# Patient Record
Sex: FEMALE | Race: WHITE | Employment: UNEMPLOYED | URBAN - METROPOLITAN AREA
[De-identification: names, ages, dates, MRNs, and addresses within clinical notes are randomized per-mention and may not be internally consistent; named-entity substitution may affect disease eponyms.]

---

## 2017-03-10 DIAGNOSIS — E03.9 HYPOTHYROIDISM: ICD-10-CM

## 2017-03-15 RX ORDER — LEVOTHYROXINE SODIUM 88 UG/1
TABLET ORAL
Qty: 90 TABLET | Refills: 1 | Status: SHIPPED | OUTPATIENT
Start: 2017-03-15 | End: 2018-03-19 | Stop reason: SDUPTHER

## 2017-04-28 DIAGNOSIS — E55.9 VITAMIN D DEFICIENCY: ICD-10-CM

## 2017-05-03 RX ORDER — ERGOCALCIFEROL 1.25 MG/1
50000 CAPSULE ORAL WEEKLY
Qty: 13 CAPSULE | Refills: 0 | Status: SHIPPED | OUTPATIENT
Start: 2017-05-03

## 2018-04-30 ENCOUNTER — OFFICE VISIT (OUTPATIENT)
Dept: FAMILY MEDICINE CLINIC | Age: 63
End: 2018-04-30

## 2018-04-30 VITALS
RESPIRATION RATE: 16 BRPM | OXYGEN SATURATION: 96 % | HEART RATE: 74 BPM | SYSTOLIC BLOOD PRESSURE: 124 MMHG | TEMPERATURE: 95.9 F | BODY MASS INDEX: 35.08 KG/M2 | DIASTOLIC BLOOD PRESSURE: 86 MMHG | WEIGHT: 185.8 LBS

## 2018-04-30 DIAGNOSIS — E03.9 HYPOTHYROIDISM, UNSPECIFIED TYPE: ICD-10-CM

## 2018-04-30 PROCEDURE — 1036F TOBACCO NON-USER: CPT | Performed by: FAMILY MEDICINE

## 2018-04-30 PROCEDURE — G8427 DOCREV CUR MEDS BY ELIG CLIN: HCPCS | Performed by: FAMILY MEDICINE

## 2018-04-30 PROCEDURE — 99213 OFFICE O/P EST LOW 20 MIN: CPT | Performed by: FAMILY MEDICINE

## 2018-04-30 PROCEDURE — G8417 CALC BMI ABV UP PARAM F/U: HCPCS | Performed by: FAMILY MEDICINE

## 2018-04-30 PROCEDURE — 3017F COLORECTAL CA SCREEN DOC REV: CPT | Performed by: FAMILY MEDICINE

## 2018-04-30 RX ORDER — LEVOTHYROXINE SODIUM 88 UG/1
TABLET ORAL
Qty: 90 TABLET | Refills: 3 | Status: SHIPPED | OUTPATIENT
Start: 2018-04-30 | End: 2019-05-03 | Stop reason: SDUPTHER

## 2018-05-03 DIAGNOSIS — R74.8 ELEVATED SERUM GGT LEVEL: Primary | ICD-10-CM

## 2018-05-25 ENCOUNTER — HOSPITAL ENCOUNTER (OUTPATIENT)
Dept: ULTRASOUND IMAGING | Age: 63
Discharge: HOME | End: 2018-05-25
Attending: FAMILY MEDICINE | Admitting: FAMILY MEDICINE

## 2018-05-25 DIAGNOSIS — R74.8 ELEVATED SERUM GGT LEVEL: ICD-10-CM

## 2018-05-25 DIAGNOSIS — R74.8 ABNORMAL LEVELS OF OTHER SERUM ENZYMES: ICD-10-CM

## 2018-06-18 ENCOUNTER — OFFICE VISIT (OUTPATIENT)
Dept: FAMILY MEDICINE CLINIC | Age: 63
End: 2018-06-18

## 2018-06-18 VITALS
WEIGHT: 183.6 LBS | TEMPERATURE: 97.8 F | OXYGEN SATURATION: 97 % | RESPIRATION RATE: 14 BRPM | DIASTOLIC BLOOD PRESSURE: 69 MMHG | SYSTOLIC BLOOD PRESSURE: 127 MMHG | BODY MASS INDEX: 34.66 KG/M2 | HEART RATE: 56 BPM

## 2018-06-18 DIAGNOSIS — E66.9 OBESITY, CLASS I, BMI 30.0-34.9 (SEE ACTUAL BMI): ICD-10-CM

## 2018-06-18 DIAGNOSIS — K75.81 NASH (NONALCOHOLIC STEATOHEPATITIS): Primary | ICD-10-CM

## 2018-06-18 PROCEDURE — 99213 OFFICE O/P EST LOW 20 MIN: CPT | Performed by: FAMILY MEDICINE

## 2019-05-03 DIAGNOSIS — E03.9 HYPOTHYROIDISM, UNSPECIFIED TYPE: ICD-10-CM

## 2019-05-03 DIAGNOSIS — E66.9 OBESITY, CLASS I, BMI 30.0-34.9 (SEE ACTUAL BMI): ICD-10-CM

## 2019-05-03 DIAGNOSIS — R74.8 ELEVATED SERUM GGT LEVEL: ICD-10-CM

## 2019-05-03 DIAGNOSIS — E03.9 ACQUIRED HYPOTHYROIDISM: ICD-10-CM

## 2019-05-03 DIAGNOSIS — Z13.1 SCREENING FOR DIABETES MELLITUS: ICD-10-CM

## 2019-05-03 DIAGNOSIS — E55.9 VITAMIN D DEFICIENCY: ICD-10-CM

## 2019-05-03 DIAGNOSIS — K75.81 NASH (NONALCOHOLIC STEATOHEPATITIS): Primary | ICD-10-CM

## 2019-05-03 RX ORDER — LEVOTHYROXINE SODIUM 88 UG/1
TABLET ORAL
Qty: 90 TABLET | Refills: 0 | Status: SHIPPED | OUTPATIENT
Start: 2019-05-03 | End: 2020-01-07

## 2019-05-08 NOTE — TELEPHONE ENCOUNTER
Spoke to patient and she does not have insurance. She will go on medicare in July and will have labs done then and make appointment at that time.      FYI

## 2019-11-18 ENCOUNTER — OFFICE VISIT (OUTPATIENT)
Dept: ORTHOPEDIC SURGERY | Age: 64
End: 2019-11-18
Payer: MEDICARE

## 2019-11-18 VITALS
HEIGHT: 60 IN | WEIGHT: 180 LBS | BODY MASS INDEX: 35.34 KG/M2 | SYSTOLIC BLOOD PRESSURE: 123 MMHG | DIASTOLIC BLOOD PRESSURE: 73 MMHG

## 2019-11-18 DIAGNOSIS — M17.11 PRIMARY OSTEOARTHRITIS OF RIGHT KNEE: Primary | ICD-10-CM

## 2019-11-18 PROCEDURE — 20610 DRAIN/INJ JOINT/BURSA W/O US: CPT | Performed by: ORTHOPAEDIC SURGERY

## 2019-11-18 PROCEDURE — 3017F COLORECTAL CA SCREEN DOC REV: CPT | Performed by: ORTHOPAEDIC SURGERY

## 2019-11-18 PROCEDURE — 1036F TOBACCO NON-USER: CPT | Performed by: ORTHOPAEDIC SURGERY

## 2019-11-18 PROCEDURE — 1123F ACP DISCUSS/DSCN MKR DOCD: CPT | Performed by: ORTHOPAEDIC SURGERY

## 2019-11-18 PROCEDURE — G8484 FLU IMMUNIZE NO ADMIN: HCPCS | Performed by: ORTHOPAEDIC SURGERY

## 2019-11-18 PROCEDURE — 1090F PRES/ABSN URINE INCON ASSESS: CPT | Performed by: ORTHOPAEDIC SURGERY

## 2019-11-18 PROCEDURE — 99204 OFFICE O/P NEW MOD 45 MIN: CPT | Performed by: ORTHOPAEDIC SURGERY

## 2019-11-18 PROCEDURE — G8400 PT W/DXA NO RESULTS DOC: HCPCS | Performed by: ORTHOPAEDIC SURGERY

## 2019-11-18 PROCEDURE — 4040F PNEUMOC VAC/ADMIN/RCVD: CPT | Performed by: ORTHOPAEDIC SURGERY

## 2019-11-18 PROCEDURE — G8417 CALC BMI ABV UP PARAM F/U: HCPCS | Performed by: ORTHOPAEDIC SURGERY

## 2019-11-18 PROCEDURE — G8427 DOCREV CUR MEDS BY ELIG CLIN: HCPCS | Performed by: ORTHOPAEDIC SURGERY

## 2019-11-18 RX ORDER — BETAMETHASONE SODIUM PHOSPHATE AND BETAMETHASONE ACETATE 3; 3 MG/ML; MG/ML
12 INJECTION, SUSPENSION INTRA-ARTICULAR; INTRALESIONAL; INTRAMUSCULAR; SOFT TISSUE ONCE
Status: COMPLETED | OUTPATIENT
Start: 2019-11-18 | End: 2019-11-18

## 2019-11-18 RX ORDER — MELOXICAM 15 MG/1
15 TABLET ORAL DAILY
Qty: 90 TABLET | Refills: 3 | Status: ON HOLD | OUTPATIENT
Start: 2019-11-18 | End: 2020-11-03 | Stop reason: HOSPADM

## 2019-11-18 RX ADMIN — BETAMETHASONE SODIUM PHOSPHATE AND BETAMETHASONE ACETATE 12 MG: 3; 3 INJECTION, SUSPENSION INTRA-ARTICULAR; INTRALESIONAL; INTRAMUSCULAR; SOFT TISSUE at 12:36

## 2019-11-19 ENCOUNTER — TELEPHONE (OUTPATIENT)
Dept: ORTHOPEDIC SURGERY | Age: 64
End: 2019-11-19

## 2020-01-08 RX ORDER — LEVOTHYROXINE SODIUM 88 UG/1
TABLET ORAL
Qty: 30 TABLET | Refills: 0 | Status: SHIPPED | OUTPATIENT
Start: 2020-01-08 | End: 2020-01-27 | Stop reason: SDUPTHER

## 2020-01-27 ENCOUNTER — OFFICE VISIT (OUTPATIENT)
Dept: FAMILY MEDICINE CLINIC | Age: 65
End: 2020-01-27
Payer: MEDICARE

## 2020-01-27 VITALS
BODY MASS INDEX: 35.34 KG/M2 | OXYGEN SATURATION: 95 % | HEIGHT: 61 IN | WEIGHT: 187.2 LBS | RESPIRATION RATE: 16 BRPM | SYSTOLIC BLOOD PRESSURE: 132 MMHG | DIASTOLIC BLOOD PRESSURE: 80 MMHG | TEMPERATURE: 97.3 F | HEART RATE: 68 BPM

## 2020-01-27 DIAGNOSIS — K75.81 NASH (NONALCOHOLIC STEATOHEPATITIS): ICD-10-CM

## 2020-01-27 DIAGNOSIS — Z13.1 SCREENING FOR DIABETES MELLITUS: ICD-10-CM

## 2020-01-27 DIAGNOSIS — E55.9 VITAMIN D DEFICIENCY: ICD-10-CM

## 2020-01-27 DIAGNOSIS — E03.9 ACQUIRED HYPOTHYROIDISM: ICD-10-CM

## 2020-01-27 DIAGNOSIS — R74.8 ELEVATED SERUM GGT LEVEL: ICD-10-CM

## 2020-01-27 DIAGNOSIS — E66.9 OBESITY, CLASS I, BMI 30.0-34.9 (SEE ACTUAL BMI): ICD-10-CM

## 2020-01-27 LAB
A/G RATIO: 1.8 (ref 1.1–2.2)
ALBUMIN SERPL-MCNC: 4.6 G/DL (ref 3.4–5)
ALP BLD-CCNC: 110 U/L (ref 40–129)
ALT SERPL-CCNC: 27 U/L (ref 10–40)
ANION GAP SERPL CALCULATED.3IONS-SCNC: 15 MMOL/L (ref 3–16)
AST SERPL-CCNC: 23 U/L (ref 15–37)
BILIRUB SERPL-MCNC: 0.7 MG/DL (ref 0–1)
BUN BLDV-MCNC: 21 MG/DL (ref 7–20)
CALCIUM SERPL-MCNC: 10 MG/DL (ref 8.3–10.6)
CHLORIDE BLD-SCNC: 104 MMOL/L (ref 99–110)
CHOLESTEROL, TOTAL: 233 MG/DL (ref 0–199)
CO2: 23 MMOL/L (ref 21–32)
CREAT SERPL-MCNC: 0.5 MG/DL (ref 0.6–1.2)
GAMMA GLUTAMYL TRANSFERASE: 53 U/L (ref 5–36)
GFR AFRICAN AMERICAN: >60
GFR NON-AFRICAN AMERICAN: >60
GLOBULIN: 2.5 G/DL
GLUCOSE BLD-MCNC: 91 MG/DL (ref 70–99)
HDLC SERPL-MCNC: 72 MG/DL (ref 40–60)
LDL CHOLESTEROL CALCULATED: 141 MG/DL
POTASSIUM SERPL-SCNC: 4.5 MMOL/L (ref 3.5–5.1)
SODIUM BLD-SCNC: 142 MMOL/L (ref 136–145)
TOTAL PROTEIN: 7.1 G/DL (ref 6.4–8.2)
TRIGL SERPL-MCNC: 98 MG/DL (ref 0–150)
TSH REFLEX: 3.06 UIU/ML (ref 0.27–4.2)
VITAMIN D 25-HYDROXY: 21.7 NG/ML
VLDLC SERPL CALC-MCNC: 20 MG/DL

## 2020-01-27 PROCEDURE — 4040F PNEUMOC VAC/ADMIN/RCVD: CPT | Performed by: FAMILY MEDICINE

## 2020-01-27 PROCEDURE — G0438 PPPS, INITIAL VISIT: HCPCS | Performed by: FAMILY MEDICINE

## 2020-01-27 PROCEDURE — G8482 FLU IMMUNIZE ORDER/ADMIN: HCPCS | Performed by: FAMILY MEDICINE

## 2020-01-27 PROCEDURE — 1123F ACP DISCUSS/DSCN MKR DOCD: CPT | Performed by: FAMILY MEDICINE

## 2020-01-27 PROCEDURE — 3017F COLORECTAL CA SCREEN DOC REV: CPT | Performed by: FAMILY MEDICINE

## 2020-01-27 PROCEDURE — 90732 PPSV23 VACC 2 YRS+ SUBQ/IM: CPT | Performed by: FAMILY MEDICINE

## 2020-01-27 PROCEDURE — G0009 ADMIN PNEUMOCOCCAL VACCINE: HCPCS | Performed by: FAMILY MEDICINE

## 2020-01-27 RX ORDER — LEVOTHYROXINE SODIUM 88 UG/1
88 TABLET ORAL DAILY
Qty: 90 TABLET | Refills: 3 | Status: SHIPPED | OUTPATIENT
Start: 2020-01-27 | End: 2021-07-06

## 2020-01-27 ASSESSMENT — LIFESTYLE VARIABLES: HOW OFTEN DO YOU HAVE A DRINK CONTAINING ALCOHOL: 0

## 2020-01-27 ASSESSMENT — PATIENT HEALTH QUESTIONNAIRE - PHQ9
SUM OF ALL RESPONSES TO PHQ QUESTIONS 1-9: 1
SUM OF ALL RESPONSES TO PHQ QUESTIONS 1-9: 1

## 2020-01-27 NOTE — PATIENT INSTRUCTIONS
1) Get your labwork done at the [address] [place] [address]. --You can get your lab work, x-ray, MRI, or ultrasound at our nearest [facility] location across the parking lot at   [address], [address]  Lab hours (7AM - 5PM Monday through Friday; 8AM - noon on Saturdays),   Ph# [phone]  Radiology hours (7:00AM - 5PM Monday through Friday only)  --Call our office in 1 week if you have not heard about the results. Or check [facility] if you have previously enrolled. Personalized Preventive [facility] for [facility] - 1/27/2020  Medicare offers a range of preventive health benefits. Some of the tests and screenings are paid in full while other may be subject to a deductible, co-insurance, and/or copay. Some of these benefits include a comprehensive review of your medical history including lifestyle, illnesses that may run in your family, and various assessments and screenings as appropriate. After reviewing your medical record and screening and assessments performed today your provider may have ordered immunizations, labs, imaging, and/or referrals for you. A list of these orders (if applicable) as well as your Preventive Care list are included within your After Visit Summary for your review. Other Preventive Recommendations:    · A preventive eye exam performed by an eye specialist is recommended every 1-2 years to screen for glaucoma; cataracts, macular degeneration, and other eye disorders. · A preventive dental visit is recommended every 6 months. · Try to get at least 150 minutes of exercise per week or 10,000 steps per day on a pedometer . · Order or download the FREE \"Exercise & Physical Activity: Your Everyday Guide\" from [employer]. Call 4-608.364.4364 or search [employer] online. · You need 9222-9254 mg of calcium and 3112-7866 IU of vitamin D per day.  It is possible to meet your calcium requirement with diet alone, but a vitamin D supplement is usually necessary to meet this goal.  · When exposed to the sun, use a sunscreen that protects against both UVA and UVB radiation with an SPF of 30 or greater. Reapply every 2 to 3 hours or after sweating, drying off with a towel, or swimming. · Always wear a seat belt when traveling in a car. Always wear a helmet when riding a bicycle or motorcycle.

## 2020-01-27 NOTE — PROGRESS NOTES
evaluation of cognition reveals recent and remote memory intact. General Appearance: alert and oriented to person, place and time, well developed and well- nourished, in no acute distress  Skin: warm and dry, no rash or erythema  Head: normocephalic and atraumatic  Eyes: pupils equal, round, and reactive to light, extraocular eye movements intact, conjunctivae normal  ENT: tympanic membrane, external ear and ear canal normal bilaterally, nose without deformity, nasal mucosa and turbinates normal without polyps  Neck: supple and non-tender without mass, no thyromegaly or thyroid nodules, no cervical lymphadenopathy  Pulmonary/Chest: clear to auscultation bilaterally- no wheezes, rales or rhonchi, normal air movement, no respiratory distress  Cardiovascular: normal rate, regular rhythm, normal S1 and S2, no murmurs, rubs, clicks, or gallops, distal pulses intact, no carotid bruits  Abdomen: soft, non-tender, non-distended, normal bowel sounds, no masses or organomegaly  Extremities: no cyanosis, clubbing or edema  Musculoskeletal: normal range of motion, no joint swelling, deformity or tenderness  Neurologic: reflexes normal and symmetric, no cranial nerve deficit, gait, coordination and speech normal    Patient's complete Health Risk Assessment and screening values have been reviewed and are found in Flowsheets. The following problems were reviewed today and where indicated follow up appointments were made and/or referrals ordered. Positive Risk Factor Screenings with Interventions:     General Health:  General  In general, how would you say your health is?: Very Good  In the past 7 days, have you experienced any of the following?  New or Increased Pain, New or Increased Fatigue, Loneliness, Social Isolation, Stress or Anger?: None of These  Do you get the social and emotional support that you need?: Yes  Do you have a Living Will?: (!) No  General Health Risk Interventions:  · n/a    Health Habits/Nutrition:  Health Habits/Nutrition  Do you exercise for at least 20 minutes 2-3 times per week?: Yes  Have you lost any weight without trying in the past 3 months?: No  Do you eat fewer than 2 meals per day?: No  Have you seen a dentist within the past year?: (!) No  Body mass index is 35.37 kg/m².   Health Habits/Nutrition Interventions:  · Inadequate physical activity:  patient is not ready to increase his/her physical activity level at this time    Safety:  Safety  Do you have working smoke detectors?: (!) No  Have all throw rugs been removed or fastened?: (!) No  Do you have non-slip mats or surfaces in all bathtubs/showers?: (!) No  Do all of your stairways have a railing or banister?: Yes  Are your doorways, halls and stairs free of clutter?: Yes  Do you always fasten your seatbelt when you are in a car?: Yes  Safety Interventions:  · Home safety tips provided    Personalized Preventive Plan   Current Health Maintenance Status  Immunization History   Administered Date(s) Administered    Influenza A ([id number]) Vaccine PF IM 12/31/2009    Influenza Vaccine, unspecified formulation 09/07/2012, 09/15/2014, 09/25/2016    Influenza Virus Vaccine 09/07/2012, 09/15/2014, 08/31/2015    Influenza Whole 10/06/2009    Influenza, High Dose (Fluzone 65 yrs and older) 11/06/2019    Influenza, Noah Kapoor, IM, PF (6 mo and older Fluzone, Flulaval, Fluarix, and 3 yrs and older Afluria) 09/05/2017, 10/03/2018    Influenza, Triv, 3 Years and older, IM, PF (Afluria 5yrs and older) 09/25/2016    Pneumococcal Polysaccharide ([id number]) 01/27/2020    Td (Adult), 5 Lf Tetanus Toxoid, Pf (Tenivac, Decavac) 06/21/2005    Tdap (Boostrix, Adacel) 11/16/2010, 06/06/2015        Health Maintenance   Topic Date Due    Hepatitis C screen  1955    HIV screen  07/10/1969    Cervical cancer screen  07/10/1975    Shingles Vaccine (1 of 2) 07/10/2004    Breast cancer screen  02/02/2017    TSH testing  04/30/2019    DEXA (modify frequency per FRAX score)  07/10/2019    Annual Wellness Visit (AWV)  11/17/2019    Diabetes screen  04/30/2021    Colon cancer screen fecal DNA test (Cologuard)  05/23/2021    Lipid screen  04/30/2023    DTaP/Tdap/Td vaccine (3 - Td) 06/06/2025    Flu vaccine  Completed    Pneumococcal 65+ years Vaccine  Completed     Recommendations for Preventive Services Due: see orders and patient instructions/AVS.  . Recommended screening schedule for the next 5-10 years is provided to the patient in written form: see Patient Instructions/AVS.    Adilene Grider was seen today for medicare awv. Diagnoses and all orders for this visit:    Routine general medical examination at a health care facility    Hypothyroidism, unspecified type  -     levothyroxine (SYNTHROID) 88 MCG tablet;  Take 1 tablet by mouth Daily    Need for 23-polyvalent pneumococcal polysaccharide vaccine  -     PNEUMOVAX 23 subcutaneous/IM (Pneumococcal polysaccharide vaccine 23-valent >= 1yo)

## 2020-01-28 LAB
ESTIMATED AVERAGE GLUCOSE: 96.8 MG/DL
HBA1C MFR BLD: 5 %

## 2020-02-20 ENCOUNTER — OFFICE VISIT (OUTPATIENT)
Dept: ORTHOPEDIC SURGERY | Age: 65
End: 2020-02-20
Payer: MEDICARE

## 2020-02-20 VITALS — HEIGHT: 61 IN | BODY MASS INDEX: 35.3 KG/M2 | WEIGHT: 187 LBS

## 2020-02-20 PROCEDURE — 99214 OFFICE O/P EST MOD 30 MIN: CPT | Performed by: ORTHOPAEDIC SURGERY

## 2020-02-20 PROCEDURE — G8427 DOCREV CUR MEDS BY ELIG CLIN: HCPCS | Performed by: ORTHOPAEDIC SURGERY

## 2020-02-20 PROCEDURE — G8482 FLU IMMUNIZE ORDER/ADMIN: HCPCS | Performed by: ORTHOPAEDIC SURGERY

## 2020-02-20 PROCEDURE — 1090F PRES/ABSN URINE INCON ASSESS: CPT | Performed by: ORTHOPAEDIC SURGERY

## 2020-02-20 PROCEDURE — 4040F PNEUMOC VAC/ADMIN/RCVD: CPT | Performed by: ORTHOPAEDIC SURGERY

## 2020-02-20 PROCEDURE — 1036F TOBACCO NON-USER: CPT | Performed by: ORTHOPAEDIC SURGERY

## 2020-02-20 PROCEDURE — G8400 PT W/DXA NO RESULTS DOC: HCPCS | Performed by: ORTHOPAEDIC SURGERY

## 2020-02-20 PROCEDURE — G8417 CALC BMI ABV UP PARAM F/U: HCPCS | Performed by: ORTHOPAEDIC SURGERY

## 2020-02-20 PROCEDURE — 1123F ACP DISCUSS/DSCN MKR DOCD: CPT | Performed by: ORTHOPAEDIC SURGERY

## 2020-02-20 PROCEDURE — 3017F COLORECTAL CA SCREEN DOC REV: CPT | Performed by: ORTHOPAEDIC SURGERY

## 2020-05-11 ENCOUNTER — TELEPHONE (OUTPATIENT)
Dept: ORTHOPEDIC SURGERY | Age: 65
End: 2020-05-11

## 2020-05-11 ENCOUNTER — OFFICE VISIT (OUTPATIENT)
Dept: ORTHOPEDIC SURGERY | Age: 65
End: 2020-05-11
Payer: MEDICARE

## 2020-05-11 VITALS — HEIGHT: 61 IN | TEMPERATURE: 98 F | WEIGHT: 186.95 LBS | BODY MASS INDEX: 35.3 KG/M2

## 2020-05-11 PROCEDURE — G8417 CALC BMI ABV UP PARAM F/U: HCPCS | Performed by: ORTHOPAEDIC SURGERY

## 2020-05-11 PROCEDURE — 99214 OFFICE O/P EST MOD 30 MIN: CPT | Performed by: ORTHOPAEDIC SURGERY

## 2020-05-11 PROCEDURE — 1090F PRES/ABSN URINE INCON ASSESS: CPT | Performed by: ORTHOPAEDIC SURGERY

## 2020-05-11 PROCEDURE — G8400 PT W/DXA NO RESULTS DOC: HCPCS | Performed by: ORTHOPAEDIC SURGERY

## 2020-05-11 PROCEDURE — 1036F TOBACCO NON-USER: CPT | Performed by: ORTHOPAEDIC SURGERY

## 2020-05-11 PROCEDURE — 3017F COLORECTAL CA SCREEN DOC REV: CPT | Performed by: ORTHOPAEDIC SURGERY

## 2020-05-11 PROCEDURE — 4040F PNEUMOC VAC/ADMIN/RCVD: CPT | Performed by: ORTHOPAEDIC SURGERY

## 2020-05-11 PROCEDURE — G8428 CUR MEDS NOT DOCUMENT: HCPCS | Performed by: ORTHOPAEDIC SURGERY

## 2020-05-11 PROCEDURE — 1123F ACP DISCUSS/DSCN MKR DOCD: CPT | Performed by: ORTHOPAEDIC SURGERY

## 2020-05-11 NOTE — PROGRESS NOTES
5/11/20  History of Present Illness:  Neil Land is a 71 y.o. female    Chief complaint today in the office: Check evaluation right knee    Location right knee   Severity moderate to severe  Duration recently she could not get out of a chair and it was locked she had to have people pull her out of the chair  Associated sign/symptoms pain, catching, locking,    Medical History  Patient's medications, allergies, past medical, surgical, social and family histories were reviewed and updated as appropriate. Review of Systems  No new rashes  No numbness  No tingling  No fever  No depression  No new pain pattern  Pertinent items are noted in HPI  Review of systems reviewed from Patient History Form dated on 11/18/2019 and available in the patient's chart under the Media tab. No change in the patient's medical history form. Examination:  General Exam:    Vitals: not currently breastfeeding. Constitutional: Patient is adequately groomed with no evidence of malnutrition  Mental Status: The patient is alert and oriented to time, place and person. The patient's mood and affect are appropriate. Lymphatic: The lymphatic examination bilaterally reveals all areas to be without enlargement or induration. Vascular: Examination reveals no swelling or calf tenderness. Peripheral pulses are palpable and 2+. Neurological: The patient has good coordination. There is no weakness or sensory deficit. Skin:    Head/Neck: inspection reveals no rashes, ulcerations or lesions. Trunk:  inspection reveals no rashes, ulcerations or lesions. Right Lower Extremity: inspection reveals no rashes, ulcerations or lesions. Left Lower Extremity: inspection reveals no rashes, ulcerations or lesions.                                           PHYSICAL EXAM:        Knee Examination  Inspection:  No abrasions no lacerations no signs of infection or obvious deformity moderate obvious  swelling and joint effusion     Palpation: Palpation reveals moderate pain medial joint line,   Mild lateral joint line pain, moderate joint effusion    Range of Motion: 0-130 degrees flexion/ extension   Hip extension to 20 hip flexion to 70+  Lumbar ROM -20 extension flexion to 6 inches from the floor      Strength: Quadriceps testing 5/5, hamstring muscle testing 4/5, EHL against resistance is 5/5, hip flexor strength is intact 5/5, internal and external rotation of the hip against resistance is also intact 5/5    Special Tests: stable Lachman, negative anterior drawer, negative pivot shift, no posterior sag no posterior drawer does not open to valgus or varus stress at 0 or 30°, Steinmann's negative, Yulia's negative, Homans negative Rg negative, pedal pulses are +2/4 capillary refill is brisk sensation is intact ankle exam and hip exam are shows no pain with full range of motion provocative testing of the hip is nonpainful muscle testing around the hip is 5 over 5. Lumbar flexion to 6 inches from floor without pain      Inspection:      Gait: antalgic     Reflex:    Lower extremity Deep tendon reflexes +2/4 and equal bilaterally for patella and Achilles  Upper extremity reflexes:  of the biceps, triceps, brachioradialis +2/4 equal bilaterally    Contralateral  Knee: Negative Lachman negative anterior drawer negative pivot shift no posterior sag no posterior drawer does not open to valgus or varus stress at 0 or 30° negative Steinmann's negative Yulia's negative Homans negative Rg pedal pulses are +2/4 capillary refill is brisk sensation is intact ankle exam and hip exam are shows no pain with full range of motion provocative testing of the hip is nonpainful muscle testing around the hip is 5 over 5. Hip and lumbar testing does not reproduce pain evocative testing does not reproduce symptomatology.           Additional Examinations:  Right Upper Extremity:  Examination of the right upper extremity does not show any tenderness, deformity or injury. Range of motion is unremarkable. There is no gross instability. There are no rashes, ulcerations or lesions. Strength and tone are normal.  Left Upper Extremity: Examination of the left upper extremity does not show any tenderness, deformity or injury. Range of motion is unremarkable. There is no gross instability. There are no rashes, ulcerations or lesions. Strength and tone are normal.  Lower Back: Examination of the lower back does not show any tenderness, deformity or injury. Range of motion is unremarkable. There is no gross instability. There are no rashes, ulcerations or lesions. Strength and tone are normal.    Past Surgical History:   Procedure Laterality Date    EYE MUSCLE SURGERY      age 14     LITHOTRIPSY      2012, 2013    [address] [facility]   . Radiology:     X-rays reviewed in office:  I independently reviewed the films in the office today. Views AP bilateral knees today  Body Part right and left knee  Impression the standing shots show the right knee to be moderate to severe osteoarthritis especially medial joint space with severe joint space narrowing      Assessment: Severe osteoarthritis right knee    Impression: Same with acute exacerbation    Office Procedures:  No orders of the defined types were placed in this encounter. Previous Treatments: X-ray, injections, physical therapy, anti-inflammatories,    Possible other diagnoses:      Treatment Plan: At this point she wants to discuss having a total knee arthroplasty she thinks that the injections only helped for short period of time she is getting significant pain I will have her see Dr. Billy Cristina for an evaluation and treatment and then I will see her back here for another set of [address]. MARIBEL De León. [facility]  Sports Fellowship Trained  Board Certified  [employer] Team Physician        Disclaimer:     \"This note was dictated with

## 2020-05-21 ENCOUNTER — OFFICE VISIT (OUTPATIENT)
Dept: ORTHOPEDIC SURGERY | Age: 65
End: 2020-05-21
Payer: MEDICARE

## 2020-05-21 VITALS — BODY MASS INDEX: 36.52 KG/M2 | WEIGHT: 186 LBS | HEIGHT: 60 IN

## 2020-05-21 PROCEDURE — G8427 DOCREV CUR MEDS BY ELIG CLIN: HCPCS | Performed by: ORTHOPAEDIC SURGERY

## 2020-05-21 PROCEDURE — 4040F PNEUMOC VAC/ADMIN/RCVD: CPT | Performed by: ORTHOPAEDIC SURGERY

## 2020-05-21 PROCEDURE — 99214 OFFICE O/P EST MOD 30 MIN: CPT | Performed by: ORTHOPAEDIC SURGERY

## 2020-05-21 PROCEDURE — 3017F COLORECTAL CA SCREEN DOC REV: CPT | Performed by: ORTHOPAEDIC SURGERY

## 2020-05-21 PROCEDURE — G8417 CALC BMI ABV UP PARAM F/U: HCPCS | Performed by: ORTHOPAEDIC SURGERY

## 2020-05-21 PROCEDURE — 1123F ACP DISCUSS/DSCN MKR DOCD: CPT | Performed by: ORTHOPAEDIC SURGERY

## 2020-05-21 PROCEDURE — 20610 DRAIN/INJ JOINT/BURSA W/O US: CPT | Performed by: ORTHOPAEDIC SURGERY

## 2020-05-21 PROCEDURE — G8400 PT W/DXA NO RESULTS DOC: HCPCS | Performed by: ORTHOPAEDIC SURGERY

## 2020-05-21 PROCEDURE — 1036F TOBACCO NON-USER: CPT | Performed by: ORTHOPAEDIC SURGERY

## 2020-05-21 PROCEDURE — 1090F PRES/ABSN URINE INCON ASSESS: CPT | Performed by: ORTHOPAEDIC SURGERY

## 2020-05-21 RX ORDER — BETAMETHASONE SODIUM PHOSPHATE AND BETAMETHASONE ACETATE 3; 3 MG/ML; MG/ML
6 INJECTION, SUSPENSION INTRA-ARTICULAR; INTRALESIONAL; INTRAMUSCULAR; SOFT TISSUE ONCE
Status: COMPLETED | OUTPATIENT
Start: 2020-05-21 | End: 2020-05-21

## 2020-05-21 RX ADMIN — BETAMETHASONE SODIUM PHOSPHATE AND BETAMETHASONE ACETATE 6 MG: 3; 3 INJECTION, SUSPENSION INTRA-ARTICULAR; INTRALESIONAL; INTRAMUSCULAR; SOFT TISSUE at 14:03

## 2020-05-21 NOTE — PROGRESS NOTES
Proper lifting and carrying techniques,   Weight management discussed  Quitting smoking      6. Follow up:  She is going to follow-up with Dr. Ally Franklin was instructed to call the office if her symptoms worsen or if new symptoms appear prior to the next scheduled visit. She is specifically instructed to contact the office between now & her scheduled appointment if she has concerns related to her condition or if she needs assistance in scheduling the above tests. She is   welcome to call for an appointment sooner if she has any additional concerns or questions. Sabra Soliz DO    [facility]  Sports Fellowship Trained  Board Certified  [employer] Team Physician      Disclaimer: \"This note was dictated with voice recognition software. Though review and correction are routine, we apologize for any errors. \"

## 2020-05-29 ENCOUNTER — OFFICE VISIT (OUTPATIENT)
Dept: ORTHOPEDIC SURGERY | Age: 65
End: 2020-05-29
Payer: MEDICARE

## 2020-05-29 VITALS — HEIGHT: 60 IN | BODY MASS INDEX: 36.53 KG/M2 | WEIGHT: 186.07 LBS

## 2020-05-29 PROCEDURE — 99213 OFFICE O/P EST LOW 20 MIN: CPT | Performed by: ORTHOPAEDIC SURGERY

## 2020-05-29 PROCEDURE — G8427 DOCREV CUR MEDS BY ELIG CLIN: HCPCS | Performed by: ORTHOPAEDIC SURGERY

## 2020-05-29 PROCEDURE — G8417 CALC BMI ABV UP PARAM F/U: HCPCS | Performed by: ORTHOPAEDIC SURGERY

## 2020-05-29 PROCEDURE — 1090F PRES/ABSN URINE INCON ASSESS: CPT | Performed by: ORTHOPAEDIC SURGERY

## 2020-06-03 ENCOUNTER — HOSPITAL ENCOUNTER (OUTPATIENT)
Dept: PHYSICAL THERAPY | Age: 65
Setting detail: THERAPIES SERIES
Discharge: HOME | End: 2020-06-03
Payer: MEDICARE

## 2020-06-03 PROCEDURE — 97161 PT EVAL LOW COMPLEX 20 MIN: CPT | Performed by: PHYSICAL THERAPIST

## 2020-06-03 PROCEDURE — 97110 THERAPEUTIC EXERCISES: CPT | Performed by: PHYSICAL THERAPIST

## 2020-06-03 NOTE — PLAN OF CARE
The [address] [place] [address] Karla [address]  [phone]                                                       Physical Therapy Certification    Dear Referring Practitioner: Elle Harrington MD,    We had the pleasure of evaluating the following patient for physical therapy services at [address] [place] [address]. A summary of our findings can be found in the initial assessment below. This includes our plan of care. If you have any questions or concerns regarding these findings, please do not hesitate to contact me at the office phone number checked above. Thank you for the referral.       Physician Signature:_______________________________Date:__________________  By signing above (or electronic signature), therapists plan is approved by physician      Patient: Ariadna Lowery   : 1955   MRN: [id number]  Referring Physician: Referring Practitioner: Elle Harrington MD      Evaluation Date: 6/3/2020      Medical Diagnosis Information:  Diagnosis: M17.11 (ICD-10-CM) - Primary osteoarthritis of right knee   Treatment Diagnosis: M17.11 (ICD-10-CM) - Primary osteoarthritis of right knee; R53.1 Weakness                                         Insurance information: PT Insurance Information: [employer] - [place], auth req     Precautions/ Contra-indications: none    C-SSRS Triggered by Intake questionnaire (Past 2 wk assessment):   [x] No, Questionnaire did not trigger screening.   [] Yes, Patient intake triggered further evaluation      [] C-SSRS Screening completed  [] PCP notified via Plan of Care  [] Emergency services notified     Latex Allergy:  [x]NO      []YES  Preferred Language for Healthcare:   [x]English       []other:    SUBJECTIVE: Patient stated complaint: Pt has about 5 month hx of R knee pain. Baldev Degroot in 2020 had to go to ER because she couldn't move knee.  Saw Dr. Anamaria Mccloud and had cortisone injection, which helped reduce symptoms. Babatunde Castro again on 5/11/20. Saw Dr. Karolina Bhagat on 5/29/20 and had another injection but isn't feeling a significant of a benefit. C/O: pain, stiffness. Was sent to PT for strengthening prior to TKR d/t end-stage OA. Relevant Medical History: none  Functional Disability Index: LEFS - Inaccurate - Pt did not fill out correctly    Pain Scale: 2-10/10 sharp  Easing factors: moving around - avoiding stationary position  Provocative factors: prolonged positioning     Type: [x]Constant   []Intermittent  []Radiating [x]Localized []other:     Numbness/Tingling: none    Occupation/School: Retired. Living Status/Prior Level of Function: Independent with ADLs and IADLs. OBJECTIVE:      Flexibility L R Comment   Hamstring -30 -30 90/90   Gastroc To neutral  To neutral    ITB NT NT    Quad NT NT            ROM PROM AROM Overpressure Comment    L R L R L R    Flexion 128 118        Extension 0 -3                                Strength L R Comment   Quad 4 4    Hamstring 4- 4-    Gastroc NT NT    Hip  flexion 4 4    Hip abd 4- 4-                    Special Test Results/Comment   Meniscal Click NT   Crepitus Pos   Flexion Test NT   Valgus Laxity Neg   Varus Laxity Neg   Lachmans NT   Drop Back NT       Reflexes/Sensation:    [x]Dermatomes/Myotomes intact    []Reflexes equal and normal bilaterally   []Other:    Joint mobility:     []Normal    [x]Hypo   []Hyper    Palpation: joint lines    Functional Mobility/Transfers: mod ind    Posture: fair    Bandages/Dressings/Incisions: n/a    Gait: (include devices/WB status) short step length, wide base, antalgic, SPC    Orthopedic Special Tests: see above                       [x] Patient history, allergies, meds reviewed. Medical chart reviewed. See intake form. Review Of Systems (ROS):  [x]Performed Review of systems (Integumentary, CardioPulmonary, Neurological) by intake and observation. Intake form has been scanned into medical record.  Patient (from functional questionnaire and intake)   []Reduced ability to tolerate prolonged functional positions   [x]Reduced ability or difficulty with changes of positions or transfers between positions   []Reduced ability to maintain good posture and demonstrate good body mechanics with sitting, bending, and lifting   []Reduced ability to sleep   [] Reduced ability or tolerance with driving and/or computer work   [x]Reduced ability to perform lifting, carrying tasks   [x]Reduced ability to squat   []Reduced ability to forward bend   []Reduced ability to ambulate prolonged functional periods/distances/surfaces   [x]Reduced ability to ascend/descend stairs   [x]Reduced ability to run, hop or jump   []other:     Participation Restrictions   []Reduced participation in self care activities   [x]Reduced participation in home management activities   []Reduced participation in work activities   [x]Reduced participation in social activities. []Reduced participation in sport activities. Classification :    []Signs/symptoms consistent with post-surgical status including decreased ROM, strength and function.    []Signs/symptoms consistent with joint sprain/strain   []Signs/symptoms consistent with patella-femoral syndrome   [x]Signs/symptoms consistent with knee OA/hip OA   []Signs/symptoms consistent with internal derangement of knee/Hip   []Signs/symptoms consistent with functional hip weakness/NMR control      []Signs/symptoms consistent with tendinitis/tendinosis    []signs/symptoms consistent with pathology which may benefit from Dry needling      []other:      Prognosis/Rehab Potential:      []Excellent   [x]Good    [x]Fair   []Poor    Tolerance of evaluation/treatment:    []Excellent   [x]Good    []Fair   []Poor    Physical Therapy Evaluation Complexity Justification  [x] A history of present problem with:  [] no personal factors and/or comorbidities that impact the plan of care;  [x]1-2 personal factors and/or

## 2020-06-03 NOTE — FLOWSHEET NOTE
[facility], [employer]    Physical Therapy Daily Treatment Note  Date:  6/3/2020    Patient Name:  Reese Hines    :  1955  MRN: [id number]  Restrictions/Precautions:    Medical/Treatment Diagnosis Information:  · PREHAB FOR TKR  · Diagnosis: M17.11 (ICD-10-CM) - Primary osteoarthritis of right knee  · Treatment Diagnosis: M17.11 (ICD-10-CM) - Primary osteoarthritis of right knee; R53.1 Weakness  Insurance/Certification information:  PT Insurance Information: Yaa PAL req  Physician Information:  Referring Practitioner: Kolby Casillas MD  Has the plan of care been signed (Y/N):        []  Yes  [x]  No     Date of Patient follow up with Physician: 7/10/20      Is this a Progress Report:     []  Yes  [x]  No        If Yes:  Date Range for reporting period:  Beginnin/3/20  Ending:    Progress report will be due (10 Rx or 30 days whichever is less): 1/3/35       Recertification will be due (POC Duration  / 90 days whichever is less): 7/15/20        Visit # Insurance Allowable Auth Required   1 [place] [x]  Yes []  No      HEP Access Code [email or web address]  Updated 6/3/20     OUTCOME MEASURE DATE DEFICIT   LEFS NPV           Patient given satisfaction survey?  [] Yes   [] No        Latex Allergy:  [x]NO      []YES  Preferred Language for Healthcare:   [x]English       []other:    Pain level:  3-10/10     SUBJECTIVE:  See eval    OBJECTIVE:   Flexibility L R Comment   Hamstring -30 -30 90/90   Gastroc To neutral  To neutral     ITB NT NT     Quad NT NT                            ROM PROM AROM Overpressure Comment     L R L R L R     Flexion 128 118             Extension 0 -3                                                       Strength L R Comment   Quad 4 4     Hamstring 4- 4-     Gastroc NT NT     Hip  flexion 4 4     Hip abd 4- 4-                               Special Test Results/Comment   Meniscal Click NT   Crepitus Pos   Flexion Test NT   Valgus Laxity Neg Varus Laxity Neg   Lachmans NT   Drop Back NT         Reflexes/Sensation:               [x]? Dermatomes/Myotomes intact               []?Reflexes equal and normal bilaterally              []?Other:     Joint mobility:                []?Normal                       [x]? Hypo              []?Hyper     Palpation: joint lines     Functional Mobility/Transfers: mod ind     Posture: fair     Bandages/Dressings/Incisions: n/a     Gait: (include devices/WB status) short step length, wide base, antalgic, SPC       RESTRICTIONS/PRECAUTIONS: knee OA; hx of falls    Exercises/Interventions:   Exercise/Equipment Resistance/Repetitions Other comments   Stretching     Hamstring 3 x 30\"    Towel Pull 3 x 30\"    Inclined Calf     Hip Flexion     ITB     Groin     Quad                    SLR     Supine 3 x 5    Abduction     Adduction 10 x 10\" Ball squeeze   Prone     SLR+          Isometrics     Quad sets 10 x 10\"         Patellar Glides     Medial     Superior     Inferior          ROM     Sheet Pulls     Hang Weights     Passive     Active     Weight Shift     Ankle Pumps                    CKC     Calf raises 3 x 5    Wall sits     Step ups     1 leg stand     Squatting     CC TKE     Balance     bridges          PRE     Extension  RANGE:   Flexion  RANGE:        Quantum machines     Leg press      Leg extension     Leg curl          Manual interventions                     Therapeutic Exercise and NMR EXR  [x] (83778) Provided verbal/tactile cueing for activities related to strengthening, flexibility, endurance, ROM for improvements in LE, proximal hip, and core control with self care, mobility, lifting, ambulation.  [] (75861) Provided verbal/tactile cueing for activities related to improving balance, coordination, kinesthetic sense, posture, motor skill, proprioception  to assist with LE, proximal hip, and core control in self care, mobility, lifting, ambulation and eccentric single leg control.      NMR and Therapeutic (14587):     GOALS:  (cut and paste from eval)  Patient stated goal: Improve strength prior to TKR    []? Progressing: []? Met: []? Not Met: []? Adjusted     Therapist goals for Patient:   Short Term Goals: To be achieved in: 2 weeks  1. Independent in HEP and progression per patient tolerance, in order to prevent re-injury. []? Progressing: []? Met: []? Not Met: []? Adjusted   2. Patient will have a decrease in pain to facilitate improvement in movement, function, and ADLs as indicated by Functional Deficits. []? Progressing: []? Met: []? Not Met: []? Adjusted     Long Term Goals: To be achieved in: 6 weeks  1. Disability index score of 50% or less for the LEFS to assist with reaching prior level of function. []? Progressing: []? Met: []? Not Met: []? Adjusted  2. Patient will demonstrate increased AROM to 0-125 deg to allow for proper joint functioning as indicated by patients Functional Deficits. []? Progressing: []? Met: []? Not Met: []? Adjusted  3. Patient will demonstrate an increase in Strength to good proximal hip strength and control, within 5lb HHD in LE to allow for proper functional mobility as indicated by patients Functional Deficits. []? Progressing: []? Met: []? Not Met: []? Adjusted  4. Patient will return to household functional activities without increased symptoms or restriction. []? Progressing: []? Met: []? Not Met: []? Adjusted  5. Pt will be able to complete TUG within 12 seconds. (patient specific functional goal)    []? Progressing: []? Met: []? Not Met: []? Adjusted           Overall Progression Towards Functional goals/ Treatment Progress Update:  [] Patient is progressing as expected towards functional goals listed. [] Progression is slowed due to complexities/Impairments listed. [] Progression has been slowed due to co-morbidities.   [x] Plan just implemented, too soon to assess goals progression <30days   [] Goals require adjustment due to lack of progress  [] Patient is not progressing as expected and requires additional follow up with physician  [] Other    Prognosis for POC: [x] Good [] Fair  [] Poor      Patient requires continued skilled intervention: [x] Yes  [] No    Treatment/Activity Tolerance:  [x] Patient able to complete treatment  [] Patient limited by fatigue  [] Patient limited by pain     [] Patient limited by other medical complications  [] Other:       PLAN: See eval  [] Continue per plan of care [] Alter current plan (see comments above)  [x] Plan of care initiated [] Hold pending MD visit [] Discharge      Electronically signed by:  Meredith Kidney, PT, DPT    Note: If patient does not return for scheduled/ recommended follow up visits, this note will serve as a discharge from care along with most recent update on progress.

## 2020-06-10 ENCOUNTER — HOSPITAL ENCOUNTER (OUTPATIENT)
Dept: PHYSICAL THERAPY | Age: 65
Setting detail: THERAPIES SERIES
Discharge: HOME | End: 2020-06-10
Payer: MEDICARE

## 2020-06-10 PROCEDURE — 97110 THERAPEUTIC EXERCISES: CPT | Performed by: PHYSICAL THERAPIST

## 2020-06-10 NOTE — FLOWSHEET NOTE
(typically 20 minutes face-to-face)  [] EVAL (MOD) 82889 (typically 30 minutes face-to-face)  [] EVAL (HIGH) 95066 (typically 45 minutes face-to-face)  [] RE-EVAL   [x] RG(16660) x  2   [] IONTO  [] NMR (50177) x     [] VASO  [] Manual (61496) x      [] Other:  [] TA x      [] Mech Traction (07960)  [] ES(attended) (78304)      [] ES (un) (29583):     GOALS:  (cut and paste from eval)  Patient stated goal: Improve strength prior to TKR    []? Progressing: []? Met: []? Not Met: []? Adjusted     Therapist goals for Patient:   Short Term Goals: To be achieved in: 2 weeks  1. Independent in HEP and progression per patient tolerance, in order to prevent re-injury. []? Progressing: []? Met: []? Not Met: []? Adjusted   2. Patient will have a decrease in pain to facilitate improvement in movement, function, and ADLs as indicated by Functional Deficits. []? Progressing: []? Met: []? Not Met: []? Adjusted     Long Term Goals: To be achieved in: 6 weeks  1. Disability index score of 50% or less for the LEFS to assist with reaching prior level of function. []? Progressing: []? Met: []? Not Met: []? Adjusted  2. Patient will demonstrate increased AROM to 0-125 deg to allow for proper joint functioning as indicated by patients Functional Deficits. []? Progressing: []? Met: []? Not Met: []? Adjusted  3. Patient will demonstrate an increase in Strength to good proximal hip strength and control, within 5lb HHD in LE to allow for proper functional mobility as indicated by patients Functional Deficits. []? Progressing: []? Met: []? Not Met: []? Adjusted  4. Patient will return to household functional activities without increased symptoms or restriction. []? Progressing: []? Met: []? Not Met: []? Adjusted  5. Pt will be able to complete TUG within 12 seconds. (patient specific functional goal)    []? Progressing: []? Met: []? Not Met: []?  Adjusted           Overall Progression Towards Functional goals/ Treatment Progress Update:  [] Patient is progressing as expected towards functional goals listed. [] Progression is slowed due to complexities/Impairments listed. [] Progression has been slowed due to co-morbidities. [x] Plan just implemented, too soon to assess goals progression <30days   [] Goals require adjustment due to lack of progress  [] Patient is not progressing as expected and requires additional follow up with physician  [] Other    Prognosis for POC: [x] Good [] Fair  [] Poor      Patient requires continued skilled intervention: [x] Yes  [] No    Treatment/Activity Tolerance:  [x] Patient able to complete treatment  [] Patient limited by fatigue  [] Patient limited by pain     [] Patient limited by other medical complications  [x] Other: Pt had good tolerance to program today. Fatigued quickly with supine SLR. Infrapatella pain with knee ext and end-range, so switched to 90-30 deg arc of motion with good tolerance. Updated pt's HEP. Will continue to progress strength and endurance B/L in preparation for future surgery. PLAN: See eval  [] Continue per plan of care [] Alter current plan (see comments above)  [x] Plan of care initiated [] Hold pending MD visit [] Discharge      Electronically signed by:  Serge Hyman, PT, DPT    Note: If patient does not return for scheduled/ recommended follow up visits, this note will serve as a discharge from care along with most recent update on progress.

## 2020-06-24 ENCOUNTER — HOSPITAL ENCOUNTER (OUTPATIENT)
Dept: PHYSICAL THERAPY | Age: 65
Setting detail: THERAPIES SERIES
Discharge: HOME | End: 2020-06-24
Payer: MEDICARE

## 2020-06-24 PROCEDURE — 97110 THERAPEUTIC EXERCISES: CPT | Performed by: PHYSICAL THERAPIST

## 2020-06-24 PROCEDURE — 97530 THERAPEUTIC ACTIVITIES: CPT | Performed by: PHYSICAL THERAPIST

## 2020-06-24 NOTE — FLOWSHEET NOTE
Special Test Results/Comment   Meniscal Click NT   Crepitus Pos   Flexion Test NT   Valgus Laxity Neg   Varus Laxity Neg   Lachmans NT   Drop Back NT         Reflexes/Sensation:               [x]? Dermatomes/Myotomes intact               []?Reflexes equal and normal bilaterally              []?Other:     Joint mobility:                []?Normal                       [x]? Hypo              []?Hyper     Palpation: joint lines     Functional Mobility/Transfers: mod ind     Posture: fair     Bandages/Dressings/Incisions: n/a     Gait: (include devices/WB status) short step length, wide base, antalgic, SPC; reduced tolerance to distances longer than 50 ft       RESTRICTIONS/PRECAUTIONS: knee OA; hx of falls    Exercises/Interventions: ALL EXERCISES DONE B/L  Exercise/Equipment Resistance/Repetitions Other comments   Stretching     Hamstring 3 x 30\"    Towel Pull 3 x 30\" VCs for proper form   Inclined Calf     Hip Flexion     ITB     Groin     Quad          Bike          SLR/Glutes     Supine 3 x 5    Abduction     Adduction 10 x 10\" Ball squeeze   Prone     SLR+     Supine clams 3 x 10 Blue TB tied   Mini Bridge 3 x 5         Isometrics     Quad sets 10 x 10\"              Patellar Glides     Medial     Superior     Inferior          ROM     Sheet Pulls     Hang Weights     Passive     Active     Weight Shift     Ankle Pumps                    CKC     Calf raises 3 x 10    Wall sits     Step ups     1 leg stand     Squatting     CC TKE     Balance     bridges          PRE     Extension 3 x 10 RANGE: 90-30, 2#   Flexion 3 x 10 RANGE: avail. , seated, green tb        Quantum machines     Leg press      Leg extension     Leg curl          Manual interventions                     Therapeutic Exercise and NMR EXR  [x] (05877) Provided verbal/tactile cueing for activities related to strengthening, flexibility, endurance, ROM for improvements in LE, proximal hip, and core control with self care, mobility, lifting, ambulation.  [] (22488) Provided verbal/tactile cueing for activities related to improving balance, coordination, kinesthetic sense, posture, motor skill, proprioception  to assist with LE, proximal hip, and core control in self care, mobility, lifting, ambulation and eccentric single leg control. NMR and Therapeutic Activities:    [x] (02458 or 96209) Provided verbal/tactile cueing for activities related to improving balance, coordination, kinesthetic sense, posture, motor skill, proprioception and motor activation to allow for proper function of core, proximal hip and LE with self care and ADLs  [] (95621) Gait Re-education- Provided training and instruction to the patient for proper LE, core and proximal hip recruitment and positioning and eccentric body weight control with ambulation re-education including up and down stairs     Home Exercise Program:    [x] (16049) Reviewed/Progressed HEP activities related to strengthening, flexibility, endurance, ROM of core, proximal hip and LE for functional self-care, mobility, lifting and ambulation/stair navigation   [] (55301)Reviewed/Progressed HEP activities related to improving balance, coordination, kinesthetic sense, posture, motor skill, proprioception of core, proximal hip and LE for self care, mobility, lifting, and ambulation/stair navigation      Manual Treatments:  PROM / STM / Oscillations-Mobs:  G-I, II, III, IV (PA's, Inf., Post.)  [] (41320) Provided manual therapy to mobilize LE, proximal hip and/or LS spine soft tissue/joints for the purpose of modulating pain, promoting relaxation,  increasing ROM, reducing/eliminating soft tissue swelling/inflammation/restriction, improving soft tissue extensibility and allowing for proper ROM for normal function with self care, mobility, lifting and ambulation.      Modalities:  Pt declined    Charges:  Timed Code Treatment Minutes: 45'   Total Treatment Minutes: 38'   12:05 - 12:47    [] RENE (LOW) 95386

## 2020-07-08 ENCOUNTER — HOSPITAL ENCOUNTER (OUTPATIENT)
Dept: PHYSICAL THERAPY | Age: 65
Setting detail: THERAPIES SERIES
Discharge: HOME | End: 2020-07-08
Payer: MEDICARE

## 2020-07-08 PROCEDURE — 97110 THERAPEUTIC EXERCISES: CPT | Performed by: SPECIALIST/TECHNOLOGIST

## 2020-07-08 PROCEDURE — 97140 MANUAL THERAPY 1/> REGIONS: CPT | Performed by: SPECIALIST/TECHNOLOGIST

## 2020-07-08 NOTE — FLOWSHEET NOTE
[facility], [facility]    Physical Therapy Daily Treatment Note  Date:  2020    Patient Name:  Hannah Grace    :  1955  MRN: [id number]  Restrictions/Precautions:    Medical/Treatment Diagnosis Information:  · PREHAB FOR TKR  · Diagnosis: M17.11 (ICD-10-CM) - Primary osteoarthritis of right knee  · Treatment Diagnosis: M17.11 (ICD-10-CM) - Primary osteoarthritis of right knee; R53.1 Weakness  Insurance/Certification information:  PT Insurance Information: [place], Yousif Velasquez req  Physician Information:  Referring Practitioner: Vinicius Sarmiento MD  Has the plan of care been signed (Y/N):        []  Yes  [x]  No     Date of Patient follow up with Physician: 7/10/20      Is this a Progress Report:     []  Yes  [x]  No        If Yes:  Date Range for reporting period:  Beginnin/3/20  Ending:    Progress report will be due (10 Rx or 30 days whichever is less): 98       Recertification will be due (POC Duration  / 90 days whichever is less): 7/15/20        Visit # Insurance Allowable Auth Required   4 [place] [x]  Yes []  No      HEP Access Code [email or web address]  Updated 20     OUTCOME MEASURE DATE DEFICIT   LEFS NPV           Patient given satisfaction survey? [] Yes   [] No        Latex Allergy:  [x]NO      []YES  Preferred Language for Healthcare:   [x]English       []other:    Pain level:  10     SUBJECTIVE:  Pt reports she has been working hard on her HEP. Symptoms have remained the same since last visit.      OBJECTIVE:   Flexibility L R Comment   Hamstring -30 -30 90/90   Gastroc To neutral  To neutral     ITB NT NT     Quad NT NT                            ROM PROM AROM - 20 Overpressure Comment     L R L R L R     Flexion 128 118    122         Extension 0 -3    0                                                   Strength L R Comment   Quad 4 4     Hamstring 4- 4-     Gastroc NT NT     Hip  flexion 4 4     Hip abd 4- 4-                             Special Test Results/Comment   Meniscal Click NT   Crepitus Pos   Flexion Test NT   Valgus Laxity Neg   Varus Laxity Neg   Lachmans NT   Drop Back NT         Reflexes/Sensation:               [x]? Dermatomes/Myotomes intact               []?Reflexes equal and normal bilaterally              []?Other:     Joint mobility:                []?Normal                       [x]? Hypo              []?Hyper     Palpation: joint lines     Functional Mobility/Transfers: mod ind     Posture: fair     Bandages/Dressings/Incisions: n/a     Gait: (include devices/WB status) short step length, wide base, antalgic, SPC; reduced tolerance to distances longer than 50 ft       RESTRICTIONS/PRECAUTIONS: knee OA; hx of falls    Exercises/Interventions: ALL EXERCISES DONE B/L  Exercise/Equipment Resistance/Repetitions Other comments   Stretching     Hamstring - tabletop 3 x 30\"    Towel Pull 3 x 30\" VCs for proper form   Inclined Calf     Hip Flexion     ITB     Groin     Quad          Bike          SLR/Glutes     Supine 2 x 10    Abduction     Adduction 10 x 10\" Ball squeeze   Prone     SLR+     Supine clams 3 x 10 Or. Loop    Mini Bridge 2 x 10          Isometrics     Quad sets 20 x 5\"              Patellar Glides     Medial     Superior     Inferior          ROM     Sheet Pulls     Hang Weights     Passive     Active     Weight Shift     Ankle Pumps                    CKC     Calf raises 3 x 10    Wall sits     Step ups     1 leg stand     Squatting     CC TKE     Balance     bridges          PRE          Quantum machines     Leg press      Leg extension     Leg curl          Manual interventions 10' PROM, knee ext mobs          Gait training  7' SP cane with swing through          Therapeutic Exercise and NMR EXR  [x] (22576) Provided verbal/tactile cueing for activities related to strengthening, flexibility, endurance, ROM for improvements in LE, proximal hip, and core control with self care, mobility, lifting, ambulation.   [] face-to-face)  [] EVAL (MOD) 07249 (typically 30 minutes face-to-face)  [] EVAL (HIGH) 84801 (typically 45 minutes face-to-face)  [] RE-EVAL   [x] UP(35936) x  2   [] IONTO  [] NMR (27142) x     [] VASO  [x] Manual (35041) x  1    [] Other:  [] TA x 1     [] Mech Traction (90172)  [] ES(attended) (59912)      [] ES (un) (41183):     GOALS:  (cut and paste from eval)  Patient stated goal: Improve strength prior to TKR    []? Progressing: []? Met: []? Not Met: []? Adjusted     Therapist goals for Patient:   Short Term Goals: To be achieved in: 2 weeks  1. Independent in HEP and progression per patient tolerance, in order to prevent re-injury. []? Progressing: []? Met: []? Not Met: []? Adjusted   2. Patient will have a decrease in pain to facilitate improvement in movement, function, and ADLs as indicated by Functional Deficits. []? Progressing: []? Met: []? Not Met: []? Adjusted     Long Term Goals: To be achieved in: 6 weeks  1. Disability index score of 50% or less for the LEFS to assist with reaching prior level of function. []? Progressing: []? Met: []? Not Met: []? Adjusted  2. Patient will demonstrate increased AROM to 0-125 deg to allow for proper joint functioning as indicated by patients Functional Deficits. []? Progressing: []? Met: []? Not Met: []? Adjusted  3. Patient will demonstrate an increase in Strength to good proximal hip strength and control, within 5lb HHD in LE to allow for proper functional mobility as indicated by patients Functional Deficits. []? Progressing: []? Met: []? Not Met: []? Adjusted  4. Patient will return to household functional activities without increased symptoms or restriction. []? Progressing: []? Met: []? Not Met: []? Adjusted  5. Pt will be able to complete TUG within 12 seconds. (patient specific functional goal)    []? Progressing: []? Met: []? Not Met: []?  Adjusted           Overall Progression Towards Functional goals/ Treatment Progress Update:  [] Patient is progressing as expected towards functional goals listed. [] Progression is slowed due to complexities/Impairments listed. [] Progression has been slowed due to co-morbidities. [x] Plan just implemented, too soon to assess goals progression <30days   [] Goals require adjustment due to lack of progress  [] Patient is not progressing as expected and requires additional follow up with physician  [] Other    Prognosis for POC: [x] Good [] Fair  [] Poor      Patient requires continued skilled intervention: [x] Yes  [] No    Treatment/Activity Tolerance:  [x] Patient able to complete treatment  [] Patient limited by fatigue  [] Patient limited by pain     [] Patient limited by other medical complications  [x] Other: Pt had good tolerance to program today. Fatigued quickly but improved compared to initial eval. Pt appears to be compliant with HEP to date and symptoms aren't worsening with progressions. Updated HEP today. Plan to see pt for 1 additional visit in 2 weeks then progress to iHEP at that time. PLAN: See eval  [] Continue per plan of care [] Alter current plan (see comments above)  [x] Plan of care initiated [] Hold pending MD visit [] Discharge      Electronically signed by:  Uvaldo William, PTA  [id number], John Mejia, PT, MPT      Note: If patient does not return for scheduled/ recommended follow up visits, this note will serve as a discharge from care along with most recent update on progress.

## 2020-07-10 ENCOUNTER — OFFICE VISIT (OUTPATIENT)
Dept: ORTHOPEDIC SURGERY | Age: 65
End: 2020-07-10
Payer: MEDICARE

## 2020-07-10 PROCEDURE — 99213 OFFICE O/P EST LOW 20 MIN: CPT | Performed by: ORTHOPAEDIC SURGERY

## 2020-07-10 PROCEDURE — 4040F PNEUMOC VAC/ADMIN/RCVD: CPT | Performed by: ORTHOPAEDIC SURGERY

## 2020-07-10 PROCEDURE — G8417 CALC BMI ABV UP PARAM F/U: HCPCS | Performed by: ORTHOPAEDIC SURGERY

## 2020-07-10 PROCEDURE — 1123F ACP DISCUSS/DSCN MKR DOCD: CPT | Performed by: ORTHOPAEDIC SURGERY

## 2020-07-10 PROCEDURE — 1036F TOBACCO NON-USER: CPT | Performed by: ORTHOPAEDIC SURGERY

## 2020-07-10 PROCEDURE — 1090F PRES/ABSN URINE INCON ASSESS: CPT | Performed by: ORTHOPAEDIC SURGERY

## 2020-07-10 PROCEDURE — G8427 DOCREV CUR MEDS BY ELIG CLIN: HCPCS | Performed by: ORTHOPAEDIC SURGERY

## 2020-07-10 PROCEDURE — G8400 PT W/DXA NO RESULTS DOC: HCPCS | Performed by: ORTHOPAEDIC SURGERY

## 2020-07-10 PROCEDURE — 3017F COLORECTAL CA SCREEN DOC REV: CPT | Performed by: ORTHOPAEDIC SURGERY

## 2020-07-10 NOTE — PROGRESS NOTES
Chief Complaint    Follow-up (right knee)      History of Present Illness:  Kristopher Eaton is a 76 y.o. female. She is here today for follow-up for her right knee. She does have a known history of right knee osteoarthritis. This has been treated recently with cortisone, Visco supplementation and physical therapy. She does continue to have a lot of pain within the right knee that is limiting her mobility. She is interested in total knee arthroplasty. Medical History:  Patient's medications, allergies, past medical, surgical, social and family histories were reviewed and updated as appropriate. Review of Systems:  Pertinent items are noted in HPI  Review of systems reviewed from Patient History Form dated on 7/10/20 and available in the patient's chart under the Media tab. Vital Signs: There were no vitals taken for this visit. General Exam:   Constitutional: Patient is adequately groomed with no evidence of malnutrition  DTRs: Deep tendon reflexes are intact  Mental Status: The patient is oriented to time, place and person. The patient's mood and affect are appropriate. Knee Examination:    Inspection: No significant swelling erythema noted about the right knee today     Palpation: There is tenderness palpation primarily along the medial joint line. There is mild palpable effusion within the left knee. No significant lateral joint line tenderness     Range of Motion: Extension is 0 degrees with knee flexion today to 125 degrees     Strength: She is able to do a straight leg raise     Special Tests: No instability to varus and valgus stress testing. Negative posterior drawer. Negative Apley Yulia. Negative Stinchfield.   Negative straight leg raise     Skin: There are no rashes, ulcerations or lesions.     Gait: Antalgic with a cane    Additional Comments:       Additional Examinations:         Lower Back: Examination of the lower back does not show any tenderness, deformity or injury. Range of motion is unremarkable. There is no gross instability. There are no rashes, ulcerations or lesions. Strength and tone are normal.      Assessment : Right knee end-stage osteoarthritis    Impression:  Encounter Diagnosis   Name Primary?  Primary osteoarthritis of right knee Yes       Office Procedures:  No orders of the defined types were placed in this encounter. Treatment Plan: I discussed the diagnosis and treatment options with her today. She does have end-stage osteoarthritis of her right knee. She has failed nonoperative management with Visco supplementation injections, cortisone injections, physical therapy and anti-inflammatories. At this point time I would recommend right total knee arthroplasty. We discussed risks associated with the surgery including but not limited to the risk infection, nerve damage, blood loss, knee stiffness, RSD, DVT and the need for further surgery in the future. Despite these risks she did give informed consent. She is aware the rehabilitative process is involved with recovery from the surgery. We will see her back at time of right total knee arthroplasty.

## 2020-09-01 ENCOUNTER — TELEPHONE (OUTPATIENT)
Dept: FAMILY MEDICINE CLINIC | Age: 65
End: 2020-09-01

## 2020-09-01 DIAGNOSIS — E55.9 VITAMIN D DEFICIENCY: Primary | ICD-10-CM

## 2020-09-01 DIAGNOSIS — Z23 NEED FOR 23-POLYVALENT PNEUMOCOCCAL POLYSACCHARIDE VACCINE: ICD-10-CM

## 2020-09-01 DIAGNOSIS — Z13.1 SCREENING FOR DIABETES MELLITUS: ICD-10-CM

## 2020-09-01 DIAGNOSIS — R74.8 ABNORMAL GGT TEST: ICD-10-CM

## 2020-09-01 DIAGNOSIS — E03.9 ACQUIRED HYPOTHYROIDISM: ICD-10-CM

## 2020-09-01 DIAGNOSIS — Z91.89 10 YEAR RISK OF MI OR STROKE < 7.5%: ICD-10-CM

## 2020-09-01 DIAGNOSIS — R74.8 ALKALINE PHOSPHATASE RAISED: ICD-10-CM

## 2020-09-01 NOTE — TELEPHONE ENCOUNTER
----- Message from Sajiakin Jerome sent at 9/1/2020  2:18 PM EDT -----  Subject: Message to Provider    QUESTIONS  Information for Provider? Patient missed a call about setting up an   appointment. Patient would like a call back. ---------------------------------------------------------------------------  --------------  Torsten BOYCE  What is the best way for the office to contact you? OK to leave message on   voicemail  Preferred Call Back Phone Number? [phone]  ---------------------------------------------------------------------------  --------------  SCRIPT ANSWERS  Relationship to Patient?  Self

## 2020-09-01 NOTE — TELEPHONE ENCOUNTER
In this clinic we have only very basic hearing test.    She would be better served seeing an audiologist.  Give her the phone number to our [facility] ([facility]) audiologist in [place]. She can call them to confirm insurance coverage.

## 2020-09-01 NOTE — TELEPHONE ENCOUNTER
----- Message from Zehra Begum sent at 9/1/2020  2:18 PM EDT -----  Subject: Message to Provider    QUESTIONS  Information for Provider? Patient missed a call about setting up an   appointment. Patient would like a call back. ---------------------------------------------------------------------------  --------------  [facility] [employer]  What is the best way for the office to contact you? OK to leave message on   voicemail  Preferred Call Back Phone Number? [phone]  ---------------------------------------------------------------------------  --------------  SCRIPT ANSWERS  Relationship to Patient?  Self

## 2020-09-01 NOTE — TELEPHONE ENCOUNTER
----- Message from Jaime Pang sent at 9/1/2020 11:59 AM EDT -----  Subject: Message to Provider    QUESTIONS  Information for Provider? Last physical was performed in Nov 2019   Pt wants to know if she needs blood work done and if she is able to get a   hearing test done.   ---------------------------------------------------------------------------  --------------  [address]  What is the best way for the office to contact you? OK to leave message on   voicemail  Preferred Call Back Phone Number? [phone]  ---------------------------------------------------------------------------  --------------  SCRIPT ANSWERS  Relationship to Patient? Self  Appointment reason? Symptomatic  Select script based on patient symptoms? Adult Pre-Op  Do you have question for your provider that need to be answered prior to   scheduling your pre-op appointment?  Yes

## 2020-09-02 NOTE — TELEPHONE ENCOUNTER
Labs ordered. Let her know. Diagnosis Orders   1. Vitamin D deficiency  VITAMIN D 25 HYDROXY   2. Acquired hypothyroidism  TSH with Reflex   3. Abnormal GGT test  COMPREHENSIVE METABOLIC PANEL   4. Alkaline phosphatase raised  GAMMA GT   5. 10 year risk of MI or stroke < 7.5%  Lipid Panel   6. Need for 23-polyvalent pneumococcal polysaccharide vaccine     7.  Screening for diabetes mellitus

## 2020-09-09 NOTE — PROGRESS NOTES
Alexandr Negron   1955, 76 y.o. female   [id number]       Referring Provider: SELF, recommended by Dr. Sabrina Boucher    Reason for Visit: Evaluation of suspected change in hearing, tinnitus, or balance. ADULT AUDIOLOGIC EVALUATION      Alexandr Negron is a 76 y.o. female seen today, 9/18/2020 for an initial audiologic evaluation. Patient was seen accompanied by her mother-in-law. Temperature screening at intake: Within normal limits  Patient caregiver/family present had temperature taken upon entering office and was in acceptable range. AUDIOLOGIC AND OTHER PERTINENT MEDICAL HISTORY:        Alexandr Negron noted decreased hearing bilaterally, gradual, difficulty with TV if there is competing noise, not always hearing every word when talking with family; some imbalance, uses a cane to assist with ambulation, she is having knee replacement surgery later this year, no spinning sensation; some noise exposure - she worked in [employer] for many years with headset use; unsure of family history of hearing loss. Alexandr Negron denied otalgia, aural fullness, otorrhea, tinnitus, dizziness, history of head trauma, and history of ear surgery. IMPRESSIONS:       Today's results are consistent with bilateral high frequency sensorineural hearing loss. ASSESSMENT AND FINDINGS:       Otoscopy revealed: Clear ear canal right ear; significant cerumen in left ear canal      RIGHT EAR:  Hearing Sensitivity: Within normal limits to borderline-mild through 4000 Hz precipitously sloping to moderate sensorineural hearing loss. Speech Recognition Threshold: 20 dB HL  Word Recognition: Excellent (96%), based on NU-6 25-word list at 50 dBHL, masked, using recorded speech stimuli. This finding is consistent with hearing sensitivity. Tympanometry: Normal peak pressure with high compliance, Type Ad tympanogram, consistent with hypermobile tympanic membrane.       LEFT EAR:  Hearing Sensitivity: Limits (WNL) 0 - 20   Mild 20 - 40   Moderate 40 - 55   Moderately-Severe 55 - 70   Severe 70 - 90   Profound 90 +

## 2020-09-18 ENCOUNTER — TELEPHONE (OUTPATIENT)
Dept: FAMILY MEDICINE CLINIC | Age: 65
End: 2020-09-18

## 2020-09-18 ENCOUNTER — PROCEDURE VISIT (OUTPATIENT)
Dept: AUDIOLOGY | Age: 65
End: 2020-09-18
Payer: MEDICARE

## 2020-09-18 DIAGNOSIS — E66.01 MORBID (SEVERE) OBESITY DUE TO EXCESS CALORIES (HCC): ICD-10-CM

## 2020-09-18 DIAGNOSIS — R74.8 ALKALINE PHOSPHATASE RAISED: ICD-10-CM

## 2020-09-18 DIAGNOSIS — E03.9 ACQUIRED HYPOTHYROIDISM: Primary | ICD-10-CM

## 2020-09-18 DIAGNOSIS — R74.8 ABNORMAL GGT TEST: ICD-10-CM

## 2020-09-18 DIAGNOSIS — R73.9 ELEVATED BLOOD SUGAR: ICD-10-CM

## 2020-09-18 DIAGNOSIS — Z91.89 10 YEAR RISK OF MI OR STROKE < 7.5%: ICD-10-CM

## 2020-09-18 DIAGNOSIS — E55.9 VITAMIN D DEFICIENCY: ICD-10-CM

## 2020-09-18 PROBLEM — H90.3 SENSORINEURAL HEARING LOSS, BILATERAL: Status: ACTIVE | Noted: 2020-09-18

## 2020-09-18 PROCEDURE — 92567 TYMPANOMETRY: CPT | Performed by: AUDIOLOGIST

## 2020-09-18 PROCEDURE — 92557 COMPREHENSIVE HEARING TEST: CPT | Performed by: AUDIOLOGIST

## 2020-09-18 NOTE — PATIENT INSTRUCTIONS
Good Communication Strategies    Communication can be challenging for anyone, but can be especially difficult for those with some degree of hearing loss. While we may not be able to control every factor that may lead to difficulty with communication, there are Good Communication Strategies that we can all use in our day-to-day lives. Communication takes both parties working together for it to be successful. Tips as a Listener:   1. Control your environment. It is important to limit the amount of background noise in the room when possible. You should also consider having a good light source in the room to best see the other person. 2. Ask for clarification. Instead of saying \"What?\", you can use parts of what you heard to make a new question. For example, if you heard the word \"Thursday\" but not the rest of the week, you may ask \"What was that about Thursday? \" or \"What did you want to do Thursday? \". This shows the person talking that you are listening and will help them better explain what they are saying. 3. Be an advocate for yourself. If you are hearing but not understanding, tell the other person \"I can hear you, but I need you to slow down when you speak. \"  Or if someone is facing the other direction, say \"I cannot hear you when you are not looking at me when we talk. \"       Tips as a Talker:   - Sit or stand 3 to 6 feet away to maximize audibility         -- It is unrealistic to believe someone else will fully hear your message if you are speaking from across the room or in a different room in the house   - Stay at eye level to help with visual cues   - Make sure you have the persons attention before speaking   - Use facial expressions and gestures to accentuate your message   - Raise your voice slightly (do not scream)   - Speak slowly and distinctly   - Use short, simple sentences   - Rephrase your words if the person is having a hard time understanding you    - To avoid distortion, dont speak directly into a persons ear      Some additional items that may be helpful:   - Remain patient - this is important for both parties   - Write down items that still cannot be heard/understood. You may write with pen/paper or consider typing/texting on a cell phone or smart device. - If background noise is unavoidable, try to keep yourself in a good position in the room. By sitting at a goodrich on the side of the restaurant (preferably a corner), it will be easier to communicate than if you were sitting at a table in the middle with background noise surrounding you. Keep yourself positioned away from music speakers or heavy foot traffic.   - If you have difficulty with the television, consider these options:      -- Use closed-captioning, which is a setting you can turn on that displays the spoken words in a written form on the screen. There may be a slight delay, but this can help fill in missing information. This can be especially helpful when watching programs with accented speech. -- Consider use of a sound bar or speakers that come from the front of the TV. With modern flat screen TVs, many of them have speakers that come out of the back of the device, which makes sound bounce off the wall behind it, then go into the room. Sound bars can allow the sound to go straight in your direction and can improve sound quality. -- Consider ear level devices to help improve the volume and/or sound quality of the program.  There are devices that work like headphones that you can adjust the volume for your ears while others can have the volume at a more comfortable level, such as \"TV Ears\". Most hearing aids have devices that allow them to connect directly to the TV and improve sound quality. Hearing Loss: Care Instructions  Your Care Instructions      Hearing loss is a sudden or slow decrease in how well you hear. It can range from mild to profound.  Permanent hearing loss can occur with aging, and it can happen when you are exposed long-term to loud noise. Examples include listening to loud music, riding motorcycles, or being around other loud machines. Hearing loss can affect your work and home life. It can make you feel lonely or depressed. You may feel that you have lost your independence. But hearing aids and other devices can help you hear better and feel connected to others. Follow-up care is a key part of your treatment and safety. Be sure to make and go to all appointments, and call your doctor if you are having problems. It's also a good idea to know your test results and keep a list of the medicines you take. How can you care for yourself at home? · Avoid loud noises whenever possible. This helps keep your hearing from getting worse. Always wear hearing protection around loud noises. · If appropriate, wear hearing aid(s) as directed. It is recommended that hearing aids are worn during all waking hours to keep your brain active and give it access to the sounds it is missing. · If you are beginning your process with hearing aid(s), schedule a \"Hearing Aid Evaluation\" with an audiologist to discuss your lifestyle, features of hearing aid technology, and styles of hearing aids available. It is recommended that you contact your insurance company to determine if you have a hearing aid benefit, as this may dictate who you can see for these services. · Have hearing tests as your doctor suggests. They can show whether your hearing has changed. Your hearing aid may need to be adjusted. · Use other assistive devices as needed. These may include:  ? Telephone amplifiers and hearing aids that can connect to a television, stereo, radio, or microphone. ? Devices that use lights or vibrations. These alert you to the doorbell, a ringing telephone, or a baby monitor. ? Television closed-captioning. This shows the words at the bottom of the screen. Most new TVs can do this. ? TTY (text telephone). This lets you type messages back and forth on the telephone instead of talking or listening. These devices are also called TDD. When messages are typed on the keyboard, they are sent over the phone line to a receiving TTY. The message is shown on a monitor. · Use pagers, fax machines, text, and email if it is hard for you to communicate by telephone. · Try to learn a listening technique called speech-reading. It is not lip-reading. You pay attention to people's gestures, expressions, posture, and tone of voice. These clues can help you understand what a person is saying. Face the person you are talking to, and have him or her face you. Make sure the lighting is good. You need to see the other person's face clearly. · Think about counseling if you need help to adjust to your hearing loss. When should you call for help? Watch closely for changes in your health, and be sure to contact your doctor if:    · You think your hearing is getting worse. · You have new symptoms, such as dizziness or nausea.

## 2020-09-21 NOTE — TELEPHONE ENCOUNTER
Labs are in place. Diagnosis Orders   1. Acquired hypothyroidism  TSH with Reflex   2. Vitamin D deficiency  VITAMIN D 25 HYDROXY   3. Abnormal GGT test  COMPREHENSIVE METABOLIC PANEL   4. Alkaline phosphatase raised     5. 10 year risk of MI or stroke < 7.5%  LIPID PANEL   6. Elevated blood sugar  Hemoglobin A1C   7.  Morbid (severe) obesity due to excess calories ([facility] [address]   LIPID PANEL

## 2020-09-30 DIAGNOSIS — Z91.89 10 YEAR RISK OF MI OR STROKE < 7.5%: ICD-10-CM

## 2020-09-30 DIAGNOSIS — E55.9 VITAMIN D DEFICIENCY: ICD-10-CM

## 2020-09-30 DIAGNOSIS — R74.8 ABNORMAL GGT TEST: ICD-10-CM

## 2020-09-30 DIAGNOSIS — R73.9 ELEVATED BLOOD SUGAR: ICD-10-CM

## 2020-09-30 DIAGNOSIS — E66.01 MORBID (SEVERE) OBESITY DUE TO EXCESS CALORIES (HCC): ICD-10-CM

## 2020-09-30 DIAGNOSIS — E03.9 ACQUIRED HYPOTHYROIDISM: ICD-10-CM

## 2020-09-30 LAB
A/G RATIO: 2 (ref 1.1–2.2)
ALBUMIN SERPL-MCNC: 4.5 G/DL (ref 3.4–5)
ALP BLD-CCNC: 127 U/L (ref 40–129)
ALT SERPL-CCNC: 24 U/L (ref 10–40)
ANION GAP SERPL CALCULATED.3IONS-SCNC: 11 MMOL/L (ref 3–16)
AST SERPL-CCNC: 21 U/L (ref 15–37)
BILIRUB SERPL-MCNC: 0.5 MG/DL (ref 0–1)
BUN BLDV-MCNC: 18 MG/DL (ref 7–20)
CALCIUM SERPL-MCNC: 9.9 MG/DL (ref 8.3–10.6)
CHLORIDE BLD-SCNC: 106 MMOL/L (ref 99–110)
CHOLESTEROL, TOTAL: 239 MG/DL (ref 0–199)
CO2: 25 MMOL/L (ref 21–32)
CREAT SERPL-MCNC: 0.5 MG/DL (ref 0.6–1.2)
GFR AFRICAN AMERICAN: >60
GFR NON-AFRICAN AMERICAN: >60
GLOBULIN: 2.2 G/DL
GLUCOSE BLD-MCNC: 93 MG/DL (ref 70–99)
HDLC SERPL-MCNC: 68 MG/DL (ref 40–60)
LDL CHOLESTEROL CALCULATED: 151 MG/DL
POTASSIUM SERPL-SCNC: 4.7 MMOL/L (ref 3.5–5.1)
SODIUM BLD-SCNC: 142 MMOL/L (ref 136–145)
T4 FREE: 1.3 NG/DL (ref 0.9–1.8)
TOTAL PROTEIN: 6.7 G/DL (ref 6.4–8.2)
TRIGL SERPL-MCNC: 102 MG/DL (ref 0–150)
TSH REFLEX: 5.22 UIU/ML (ref 0.27–4.2)
VITAMIN D 25-HYDROXY: 24.4 NG/ML
VLDLC SERPL CALC-MCNC: 20 MG/DL

## 2020-10-01 LAB
ESTIMATED AVERAGE GLUCOSE: 102.5 MG/DL
HBA1C MFR BLD: 5.2 %

## 2020-10-06 NOTE — PROGRESS NOTES
Name_______________________________________Printed:____________________  Date and time of surgery________11/2/20 0830________________Arrival Time:_____06[facility] [place]___   1. The instructions given regarding when and if a patient needs to stop oral intake prior to surgery varies. Follow the specific instructions you were given                  ___Nothing to eat or to drink after Midnight the night before.                             ____Endoscopy patient follow your DRS instructions-generally you will be doing a part of the prep after Midnight                   __x__Carbo loading or ERAS instructions will be given to select patients-if you have been given those instructions -please do the following                           The evening before your surgery after dinner before midnight drink 40 ounces of gatorade. If you are diabetic use sugar free. 2. Take the following pills with a small sip of water on the morning of surgery____________synthroid_______________________________________                  Do not take blood pressure medications ending in pril or sartan the emilia prior to surgery or the morning of surgery_   3. Aspirin, Ibuprofen, Advil, Naproxen, Vitamin E and other Anti-inflammatory products and supplements should be stopped for 5 -7days before surgery or as directed by your physician. 4. Check with your Doctor regarding stopping Plavix, Coumadin,Eliquis, Lovenox,Effient,Pradaxa,Xarelto, Fragmin or other blood thinners and follow their instructions. 5. Do not smoke, and do not drink any alcoholic beverages 24 hours prior to surgery. This includes NA Beer. Refrain from the usage of any recreational drugs. 6. You may brush your teeth and gargle the morning of surgery. DO NOT SWALLOW WATER   7. You MUST make arrangements for a responsible adult to stay on site while you are here and take you home after your surgery. You will not be allowed to leave alone or drive yourself home.   It is strongly suggested someone stay with you the first 24 hrs. Your surgery will be cancelled if you do not have a ride home. 8. A parent/legal guardian must accompany a child scheduled for surgery and plan to stay at the hospital until the child is discharged. Please do not bring other children with you. 9. Please wear simple, loose fitting clothing to the hospital.  Reino Apley not bring valuables (money, credit cards, checkbooks, etc.) Do not wear any makeup (including no eye makeup) or nail polish on your fingers or toes. 10. DO NOT wear any jewelry or piercings on day of surgery. All body piercing jewelry must be removed. 11. If you have ___dentures, they will be removed before going to the OR; we will provide you a container. If you wear ___contact lenses or ___glasses, they will be removed; please bring a case for them. 12. Please see your family doctor/pediatrician for a history & physical and/or concerning medications. Bring any test results/reports from your physician's office. PCP________x__________Phone___________H&P Appt. Date________             13 If you  have a Living Will and Durable Power of  for Healthcare, please bring in a copy. 15. Notify your Surgeon if you develop any illness between now and surgery  time, cough, cold, fever, sore throat, nausea, vomiting, etc.  Please notify your surgeon if you experience dizziness, shortness of breath or blurred vision between now & the time of your surgery             15. DO NOT shave your operative site 96 hours prior to surgery. For face & neck surgery, men may use an electric razor 48 hours prior to surgery. 16. Shower the night before or morning of surgery using an antibacterial soap or as you have been instructed. 17. To provide excellent care visitors will be limited to one in the room at any given time. 18.  Please bring picture ID and insurance card.              19. Visit our web site for additional information:  [email or web address]              20.During flu season no children under the age of [address] are permitted in the hospital for the safety of all patients. 21. If you take a long acting insulin in the evening only  take half of your usual  dose the night  before your procedure              22. If you use a c-pap please bring DOS if staying overnight,             23.For your convenience [facility] has a pharmacy on site to fill your prescriptions. 24. If you use oxygen and have a portable tank please bring it  with you the DOS             25. Bring a complete list of all your medications with name and dose include any supplements. 26. Other__________________________________________   *Please call pre admission testing if you any further questions   [address] [place]  [phone]   [address] [place] [address]       All above information reviewed with patient in person or by phone. Patient verbalizes understanding. All questions and concerns addressed. Patient/Rep_________pt___________     The patient will attend class on___10/12/20____________  The patent will get ordered PATs on______10/12/20_____________________________  The patient will see PCP within 30 days of scheduled surgery____10/19/20__________  COVID__10/27/20_______    There is a one visitor policy at [facility] for all surgeries and endoscopies. Whether the visitor can stay or will be asked to wait in the car will depend on the current policy and if social distancing can be maintained. The policy is subject to change at any time. Please make sure the visitor has a cell phone that is on,charged and able to accept calls, as this may be the way that the staff communicates with them. Pain management is NO VISITOR policyThe patients ride is expected to remain in the car with a cell phone for communication. If the ride is leaving the hospital grounds please make sure they are back in time for pickup. Have the patient inform the staff on arrival what their rides plans are while the patient is in the facility. At the MAIN there is one visitor allowed. Please note that the visitor policy is subject to change.                                                                                                                                    PRE OP INSTRUCTIONS

## 2020-10-12 ENCOUNTER — HOSPITAL ENCOUNTER (OUTPATIENT)
Age: 65
Discharge: HOME | End: 2020-10-12
Payer: MEDICARE

## 2020-10-12 ENCOUNTER — TELEPHONE (OUTPATIENT)
Dept: ORTHOPEDIC SURGERY | Age: 65
End: 2020-10-12

## 2020-10-12 LAB
ABO/RH: NORMAL
ANION GAP SERPL CALCULATED.3IONS-SCNC: 8 MMOL/L (ref 3–16)
ANTIBODY SCREEN: NORMAL
APTT: 31.5 SEC (ref 24.2–36.2)
BACTERIA: ABNORMAL /HPF
BASOPHILS ABSOLUTE: 0.1 K/UL (ref 0–0.2)
BASOPHILS RELATIVE PERCENT: 0.6 %
BILIRUBIN URINE: NEGATIVE
BLOOD, URINE: ABNORMAL
BUN BLDV-MCNC: 20 MG/DL (ref 7–20)
CALCIUM SERPL-MCNC: 10 MG/DL (ref 8.3–10.6)
CHLORIDE BLD-SCNC: 105 MMOL/L (ref 99–110)
CLARITY: ABNORMAL
CO2: 27 MMOL/L (ref 21–32)
COLOR: YELLOW
COMMENT UA: ABNORMAL
CREAT SERPL-MCNC: <0.5 MG/DL (ref 0.6–1.2)
EKG ATRIAL RATE: 79 BPM
EKG DIAGNOSIS: NORMAL
EKG P AXIS: 73 DEGREES
EKG P-R INTERVAL: 142 MS
EKG Q-T INTERVAL: 382 MS
EKG QRS DURATION: 76 MS
EKG QTC CALCULATION (BAZETT): 438 MS
EKG R AXIS: 64 DEGREES
EKG T AXIS: 38 DEGREES
EKG VENTRICULAR RATE: 79 BPM
EOSINOPHILS ABSOLUTE: 0.2 K/UL (ref 0–0.6)
EOSINOPHILS RELATIVE PERCENT: 2.4 %
EPITHELIAL CELLS, UA: 5 /HPF (ref 0–5)
GFR AFRICAN AMERICAN: >60
GFR NON-AFRICAN AMERICAN: >60
GLUCOSE BLD-MCNC: 85 MG/DL (ref 70–99)
GLUCOSE URINE: NEGATIVE MG/DL
HCT VFR BLD CALC: 43.9 % (ref 36–48)
HEMOGLOBIN: 14.5 G/DL (ref 12–16)
HYALINE CASTS: 7 /LPF (ref 0–8)
INR BLD: 0.93 (ref 0.86–1.14)
KETONES, URINE: NEGATIVE MG/DL
LEUKOCYTE ESTERASE, URINE: ABNORMAL
LYMPHOCYTES ABSOLUTE: 3.4 K/UL (ref 1–5.1)
LYMPHOCYTES RELATIVE PERCENT: 37 %
MCH RBC QN AUTO: 30.6 PG (ref 26–34)
MCHC RBC AUTO-ENTMCNC: 33 G/DL (ref 31–36)
MCV RBC AUTO: 92.6 FL (ref 80–100)
MICROSCOPIC EXAMINATION: YES
MONOCYTES ABSOLUTE: 0.8 K/UL (ref 0–1.3)
MONOCYTES RELATIVE PERCENT: 8.4 %
NEUTROPHILS ABSOLUTE: 4.7 K/UL (ref 1.7–7.7)
NEUTROPHILS RELATIVE PERCENT: 51.6 %
NITRITE, URINE: NEGATIVE
PDW BLD-RTO: 13.5 % (ref 12.4–15.4)
PH UA: 7.5 (ref 5–8)
PLATELET # BLD: 273 K/UL (ref 135–450)
PMV BLD AUTO: 9.1 FL (ref 5–10.5)
POTASSIUM SERPL-SCNC: 4.6 MMOL/L (ref 3.5–5.1)
PROTEIN UA: 30 MG/DL
PROTHROMBIN TIME: 10.8 SEC (ref 10–13.2)
RBC # BLD: 4.74 M/UL (ref 4–5.2)
RBC UA: 13 /HPF (ref 0–4)
SODIUM BLD-SCNC: 140 MMOL/L (ref 136–145)
SPECIFIC GRAVITY UA: 1.02 (ref 1–1.03)
URINE TYPE: ABNORMAL
UROBILINOGEN, URINE: 1 E.U./DL
WBC # BLD: 9.2 K/UL (ref 4–11)
WBC UA: 45 /HPF (ref 0–5)

## 2020-10-12 PROCEDURE — 80048 BASIC METABOLIC PNL TOTAL CA: CPT

## 2020-10-12 PROCEDURE — 93005 ELECTROCARDIOGRAM TRACING: CPT | Performed by: ORTHOPAEDIC SURGERY

## 2020-10-12 PROCEDURE — 86900 BLOOD TYPING SEROLOGIC ABO: CPT

## 2020-10-12 PROCEDURE — 87081 CULTURE SCREEN ONLY: CPT

## 2020-10-12 PROCEDURE — 85025 COMPLETE CBC W/AUTO DIFF WBC: CPT

## 2020-10-12 PROCEDURE — 85730 THROMBOPLASTIN TIME PARTIAL: CPT

## 2020-10-12 PROCEDURE — 81001 URINALYSIS AUTO W/SCOPE: CPT

## 2020-10-12 PROCEDURE — 86850 RBC ANTIBODY SCREEN: CPT

## 2020-10-12 PROCEDURE — 86901 BLOOD TYPING SEROLOGIC RH(D): CPT

## 2020-10-12 PROCEDURE — 85610 PROTHROMBIN TIME: CPT

## 2020-10-12 PROCEDURE — 93010 ELECTROCARDIOGRAM REPORT: CPT | Performed by: INTERNAL MEDICINE

## 2020-10-12 PROCEDURE — 87086 URINE CULTURE/COLONY COUNT: CPT

## 2020-10-12 NOTE — PROGRESS NOTES
Patient attended Joint Education class on 10/12/2020. Patient verified surgery for Total knee replacement and received patient information and educational JET folder including education handouts on hand hygiene and preventing constipation. Patient given handout instructions on Pre-operative Showering techniques and the use of anti-septic 3 days before surgery. Anti-septic bottle given to patient to take home. Patient states no further questions or concerns.      Pre-test score 4/5  Post-test score 5/5    DOS: 11/2/2020  Dr Hetal Francisco, RN  Orthopedic Navigator  [phone]

## 2020-10-12 NOTE — TELEPHONE ENCOUNTER
[id number]  [facility]-[id number] FOR OP  CLINIC AWARE OF CHANGE FROM IP TO OP  RT KNEE  DATE RANGE 10/12/2020 TO 12/02/2020

## 2020-10-13 LAB — URINE CULTURE, ROUTINE: NORMAL

## 2020-10-15 LAB — MRSA CULTURE ONLY: NORMAL

## 2020-10-19 ENCOUNTER — OFFICE VISIT (OUTPATIENT)
Dept: FAMILY MEDICINE CLINIC | Age: 65
End: 2020-10-19
Payer: MEDICARE

## 2020-10-19 VITALS
RESPIRATION RATE: 16 BRPM | OXYGEN SATURATION: 97 % | WEIGHT: 199.8 LBS | DIASTOLIC BLOOD PRESSURE: 82 MMHG | BODY MASS INDEX: 39.23 KG/M2 | TEMPERATURE: 97.9 F | HEART RATE: 59 BPM | SYSTOLIC BLOOD PRESSURE: 144 MMHG | HEIGHT: 60 IN

## 2020-10-19 PROCEDURE — 1036F TOBACCO NON-USER: CPT | Performed by: FAMILY MEDICINE

## 2020-10-19 PROCEDURE — G8417 CALC BMI ABV UP PARAM F/U: HCPCS | Performed by: FAMILY MEDICINE

## 2020-10-19 PROCEDURE — G8482 FLU IMMUNIZE ORDER/ADMIN: HCPCS | Performed by: FAMILY MEDICINE

## 2020-10-19 PROCEDURE — 4040F PNEUMOC VAC/ADMIN/RCVD: CPT | Performed by: FAMILY MEDICINE

## 2020-10-19 PROCEDURE — 1123F ACP DISCUSS/DSCN MKR DOCD: CPT | Performed by: FAMILY MEDICINE

## 2020-10-19 PROCEDURE — 3017F COLORECTAL CA SCREEN DOC REV: CPT | Performed by: FAMILY MEDICINE

## 2020-10-19 PROCEDURE — G8400 PT W/DXA NO RESULTS DOC: HCPCS | Performed by: FAMILY MEDICINE

## 2020-10-19 PROCEDURE — G8427 DOCREV CUR MEDS BY ELIG CLIN: HCPCS | Performed by: FAMILY MEDICINE

## 2020-10-19 PROCEDURE — 99213 OFFICE O/P EST LOW 20 MIN: CPT | Performed by: FAMILY MEDICINE

## 2020-10-19 PROCEDURE — 1090F PRES/ABSN URINE INCON ASSESS: CPT | Performed by: FAMILY MEDICINE

## 2020-10-19 NOTE — PATIENT INSTRUCTIONS
--COMPLETE THE COVID SWAB SCREENING TEST OCT 27    --CHOLESTEROL IS BORDERLINE. PLAN TO RECHECK LIPID (CHOLESTEROL LEVEL) IN 3 TO 6 MONTHS.

## 2020-10-19 NOTE — PROGRESS NOTES
Preoperative Consultation      Beryle Piedra  YOB: 1955    Date of Service:  10/19/2020    Vitals:    10/19/20 0927 10/19/20 0953   BP: (!) 143/76 (!) 144/82   Pulse: 59    Resp: 16    Temp: 97.9 °F (36.6 °C)    TempSrc: Tympanic    SpO2: 97%    Weight: 199 lb 12.8 oz (90.6 kg)    Height: 5' (1.524 m)       Wt Readings from Last 2 Encounters:   10/19/20 199 lb 12.8 oz (90.6 kg)   05/29/20 186 lb 1.1 oz (84.4 kg)     BP Readings from Last 3 Encounters:   10/19/20 (!) 144/82   01/27/20 132/80   11/18/19 123/73        Chief Complaint   Patient presents with   Agrawal Pre-op Exam     right knee replacement on 11/2 at [facility] w/ Dr. Gallego Stable   Allergen Reactions    Penicillins Rash     Outpatient Medications Marked as Taking for the 10/19/20 encounter (Office Visit) with Trupti Vasquez, DO   Medication Sig Dispense Refill    levothyroxine (SYNTHROID) 88 MCG tablet Take 1 tablet by mouth Daily 90 tablet 3    meloxicam (MOBIC) 15 MG tablet Take 1 tablet by mouth daily 90 tablet 3    vitamin D (ERGOCALCIFEROL) 74546 UNITS CAPS capsule Take 1 capsule by mouth once a week 13 capsule 0    POTASSIUM CITRATE-CITRIC ACID PO Take 1,080 mg by mouth. This patient presents to the office today for a preoperative consultation at the request of surgeon, Dr. Sun Ortega, who plans on performing Right TKA on November 12 at [facility].  The current problem began > 3 years ago and worse after fall in 2020, and symptoms have been worsening with time. Conservative therapy: Yes: corticosteroid injection spring 2020, which has been ineffective. .    ADDITIONALLY, will also  Be undergoing Cystoscopy and CT urogram by Dr. Ashwini S[facility] with [facility], [facility].     Planned anesthesia: General   Known anesthesia problems: None   Bleeding risk: No recent or remote history of abnormal bleeding  Personal or FH of DVT/PE: No    Patient objection to receiving blood products: No    The 10-year ASCVD risk score (Susana Mccollum et al., 2013) is: 7.8%    Values used to calculate the score:      Age: 76 years      Sex: Female      Is Non- : No      Diabetic: No      Tobacco smoker: No      Systolic Blood Pressure: 683 mmHg      Is BP treated: No      HDL Cholesterol: 68 mg/dL      Total Cholesterol: 239 mg/dL    Patient Active Problem List   Diagnosis    Ureteral calculi--calcium oxalate; left ureteral    Adult BMI 30+    Hypothyroidism    10 year risk of MI or stroke < 7.5%    Alkaline phosphatase raised    Vitamin D deficiency    Abnormal GGT test    Primary osteoarthritis of right knee    Sensorineural hearing loss, bilateral       Past Medical History:   Diagnosis Date    Kidney stone     Pneumonia     Thyroid disease      Past Surgical History:   Procedure Laterality Date    EYE MUSCLE SURGERY      age 14     LITHOTRIPSY      2012, 2013     TUBAL LIGATION  1995     No family history on file.   Social History     Socioeconomic History    Marital status:      Spouse name: Not on file    Number of children: Not on file    Years of education: Not on file    Highest education level: Not on file   Occupational History    Not on file   Social Needs    Financial resource strain: Not on file    Food insecurity     Worry: Not on file     Inability: Not on file    Transportation needs     Medical: Not on file     Non-medical: Not on file   Tobacco Use    Smoking status: Former Smoker    Smokeless tobacco: Never Used   Substance and Sexual Activity    Alcohol use: Not Currently    Drug use: Never    Sexual activity: Not on file   Lifestyle    Physical activity     Days per week: Not on file     Minutes per session: Not on file    Stress: Not on file   Relationships    Social connections     Talks on phone: Not on file     Gets together: Not on file     Attends Religion service: Not on file     Active member of club or organization: Not on file     Attends meetings of clubs or organizations: Not on file     Relationship status: Not on file    Intimate partner violence     Fear of current or ex partner: Not on file     Emotionally abused: Not on file     Physically abused: Not on file     Forced sexual activity: Not on file   Other Topics Concern    Not on file   Social History Narrative    Not on file       Review of Systems  A comprehensive review of systems was negative except for what was noted in the HPI. Physical Exam   Constitutional: She is oriented to person, place, and time. She appears well-developed and well-nourished. No distress. HENT:   Head: Normocephalic and atraumatic. Mouth/Throat: Uvula is midline, oropharynx is clear and moist and mucous membranes are normal.   Eyes: Conjunctivae and EOM are normal. Pupils are equal, round, and reactive to light. Neck: Trachea normal and normal range of motion. Neck supple. No JVD present. Carotid bruit is not present. No mass and no thyromegaly present. Cardiovascular: Normal rate, regular rhythm, normal heart sounds and intact distal pulses. Exam reveals no gallop and no friction rub. No murmur heard. Pulmonary/Chest: Effort normal and breath sounds normal. No respiratory distress. She has no wheezes. She has no rales. Abdominal: Soft. Normal aorta and bowel sounds are normal. She exhibits no distension and no mass. There is no hepatosplenomegaly. No tenderness. Musculoskeletal: She exhibits no edema and no tenderness. Neurological: She is alert and oriented to person, place, and time. She has normal strength. No cranial nerve deficit or sensory deficit. Coordination and gait normal.   Skin: Skin is warm and dry. No rash noted. No erythema. Psychiatric: She has a normal mood and affect. Her behavior is normal.     90% of left EAC is blocked with cerumen. EKG Interpretation:  Normal Sinus Rhythm and LAE, borderline. No previous.     Lab Review:    Results for Tracy Smith" (MRN [id number]) as of 10/19/2020 09:40   Ref.  Range 9/30/2020 09:52 10/12/2020 11:32 10/12/2020 12:45 10/12/2020 13:10   Sodium Latest Ref Range: 136 - 145 mmol/L 142  140    Potassium Latest Ref Range: 3.5 - 5.1 mmol/L 4.7  4.6    Chloride Latest Ref Range: 99 - 110 mmol/L 106  105    CO2 Latest Ref Range: 21 - 32 mmol/L 25  27    BUN Latest Ref Range: 7 - 20 mg/dL 18  20    Creatinine Latest Ref Range: 0.6 - 1.2 mg/dL 0.5 (L)  <0.5 (L)    Anion Gap Latest Ref Range: 3 - 16  11  8    GFR Non- Latest Ref Range: >60  >60  >60    GFR  Latest Ref Range: >60  >60  >60    Glucose Latest Ref Range: 70 - 99 mg/dL 93  85    Calcium Latest Ref Range: 8.3 - 10.6 mg/dL 9.9  10.0    Total Protein Latest Ref Range: 6.4 - 8.2 g/dL 6.7      Cholesterol, Total Latest Ref Range: 0 - 199 mg/dL 239 (H)      HDL Cholesterol Latest Ref Range: 40 - 60 mg/dL 68 (H)      LDL Calculated Latest Ref Range: <100 mg/dL 151 (H)      Triglycerides Latest Ref Range: 0 - 150 mg/dL 102      VLDL Cholesterol Calculated Latest Ref Range: Not Established mg/dL 20      Albumin Latest Ref Range: 3.4 - 5.0 g/dL 4.5      Globulin Latest Units: g/dL 2.2      Albumin/Globulin Ratio Latest Ref Range: 1.1 - 2.2  2.0      Alk Phos Latest Ref Range: 40 - 129 U/L 127      ALT Latest Ref Range: 10 - 40 U/L 24      AST Latest Ref Range: 15 - 37 U/L 21      Bilirubin Latest Ref Range: 0.0 - 1.0 mg/dL 0.5      Hemoglobin A1C Latest Ref Range: See comment % 5.2      eAG (mg/dL) Latest Units: mg/dL 102.5      TSH Latest Ref Range: 0.27 - 4.20 uIU/mL 5.22 (H)      T4 Free Latest Ref Range: 0.9 - 1.8 ng/dL 1.3      Vit D, 25-Hydroxy Latest Ref Range: >=30 ng/mL 24.4 (L)      WBC Latest Ref Range: 4.0 - 11.0 K/uL   9.2    RBC Latest Ref Range: 4.00 - 5.20 M/uL   4.74    Hemoglobin Quant Latest Ref Range: 12.0 - 16.0 g/dL   14.5    Hematocrit Latest Ref Range: 36.0 - 48.0 %   43.9    MCV RBC, UA Latest Ref Range: 0 - 4 /HPF    13 (H)   Epithelial Cells, UA Latest Ref Range: 0 - 5 /HPF    5   Bacteria, UA Latest Ref Range: None Seen /HPF    RARE (A)   Microscopic Examination Unknown    YES   ABO Rh Unknown   O POS    Antibody Screen Unknown   NEG    EKG 12-LEAD Unknown  Rpt     Atrial Rate Latest Units: BPM  79     Diagnosis Unknown  Normal sinus rhythmPossible Left atrial enlargementBorderline ECGNo previous ECGs availableConfir. ..     P Axis Latest Units: degrees  73     P-R Interval Latest Units: ms  142     Q-T Interval Latest Units: ms  382     QRS Duration Latest Units: ms  76     QTc Calculation (Bazett) Latest Units: ms  438     R Axis Latest Units: degrees  64     T Axis Latest Units: degrees  38     Ventricular Rate Latest Units: BPM  79            Assessment:       76 y.o. patient with planned surgery as above. Known risk factors for perioperative complications: Elevated BP  Current medications which may produce withdrawal symptoms if withheld perioperatively: none      Diagnosis Orders   1. Primary osteoarthritis of first carpometacarpal joint of right hand     2. Preop examination     3. Hematuria, unspecified type     4. Left ear impacted cerumen       Irrigation completed by medical assistant successfully. Left TM within normal limits post irrigation   Plan:     1. Preoperative workup as follows: covid screening. 2. Change in medication regimen before surgery: STOP MELOXICAM 1 WEEK BEFORE RIGHT KNEE SURGERY.   3. Prophylaxis for cardiac events with perioperative beta-blockers: Not indicated  ACC/AHA indications for pre-operative beta-blocker use:    · Vascular surgery with history of postitive stress test  · Intermediate or high risk surgery with history of CAD   · Intermediate or high risk surgery with multiple clinical predictors of CAD- 2 of the following: history of compensated or prior heart failure, history of cerebrovascular disease, DM, or renal insufficiency    Routine administration of higher-dose, long-acting metoprolol in beta-blocker-naïve patients on the day of surgery, and in the absence of dose titration is associated with an overall increase in mortality. Beta-blockers should be started days to weeks prior to surgery and titrated to pulse < 70.  4. Deep vein thrombosis prophylaxis: regimen to be chosen by surgical team  5. No contraindications to planned surgery. 6. PREOP REPORT TO BE FAXED TO DR. HARRY AND DR. Navin Massey.

## 2020-10-27 ENCOUNTER — OFFICE VISIT (OUTPATIENT)
Dept: PRIMARY CARE CLINIC | Age: 65
End: 2020-10-27
Payer: MEDICARE

## 2020-10-27 PROCEDURE — 99211 OFF/OP EST MAY X REQ PHY/QHP: CPT | Performed by: NURSE PRACTITIONER

## 2020-10-28 LAB — SARS-COV-2: NOT DETECTED

## 2020-11-01 ENCOUNTER — ANESTHESIA EVENT (OUTPATIENT)
Dept: OPERATING ROOM | Age: 65
End: 2020-11-01
Payer: MEDICARE

## 2020-11-02 ENCOUNTER — APPOINTMENT (OUTPATIENT)
Dept: GENERAL RADIOLOGY | Age: 65
End: 2020-11-02
Attending: ORTHOPAEDIC SURGERY
Payer: MEDICARE

## 2020-11-02 ENCOUNTER — HOSPITAL ENCOUNTER (OUTPATIENT)
Age: 65
Setting detail: OBSERVATION
Discharge: HOME | End: 2020-11-03
Attending: ORTHOPAEDIC SURGERY | Admitting: ORTHOPAEDIC SURGERY
Payer: MEDICARE

## 2020-11-02 ENCOUNTER — ANESTHESIA (OUTPATIENT)
Dept: OPERATING ROOM | Age: 65
End: 2020-11-02
Payer: MEDICARE

## 2020-11-02 VITALS — TEMPERATURE: 93.9 F | OXYGEN SATURATION: 99 % | DIASTOLIC BLOOD PRESSURE: 72 MMHG | SYSTOLIC BLOOD PRESSURE: 143 MMHG

## 2020-11-02 LAB
ABO/RH: NORMAL
ANION GAP SERPL CALCULATED.3IONS-SCNC: 8 MMOL/L (ref 3–16)
ANTIBODY SCREEN: NORMAL
BUN BLDV-MCNC: 11 MG/DL (ref 7–20)
CALCIUM SERPL-MCNC: 9.2 MG/DL (ref 8.3–10.6)
CHLORIDE BLD-SCNC: 110 MMOL/L (ref 99–110)
CO2: 27 MMOL/L (ref 21–32)
CREAT SERPL-MCNC: <0.5 MG/DL (ref 0.6–1.2)
GFR AFRICAN AMERICAN: >60
GFR NON-AFRICAN AMERICAN: >60
GLUCOSE BLD-MCNC: 127 MG/DL (ref 70–99)
GLUCOSE BLD-MCNC: 158 MG/DL (ref 70–99)
GLUCOSE BLD-MCNC: 94 MG/DL (ref 70–99)
HCT VFR BLD CALC: 40.7 % (ref 36–48)
HEMOGLOBIN: 13.8 G/DL (ref 12–16)
MCH RBC QN AUTO: 31.2 PG (ref 26–34)
MCHC RBC AUTO-ENTMCNC: 33.9 G/DL (ref 31–36)
MCV RBC AUTO: 92.1 FL (ref 80–100)
PDW BLD-RTO: 13.1 % (ref 12.4–15.4)
PERFORMED ON: ABNORMAL
PERFORMED ON: NORMAL
PLATELET # BLD: 246 K/UL (ref 135–450)
PMV BLD AUTO: 8.6 FL (ref 5–10.5)
POTASSIUM SERPL-SCNC: 4.7 MMOL/L (ref 3.5–5.1)
RBC # BLD: 4.42 M/UL (ref 4–5.2)
SODIUM BLD-SCNC: 145 MMOL/L (ref 136–145)
WBC # BLD: 8.7 K/UL (ref 4–11)

## 2020-11-02 PROCEDURE — 7100000000 HC PACU RECOVERY - FIRST 15 MIN: Performed by: ORTHOPAEDIC SURGERY

## 2020-11-02 PROCEDURE — 2500000003 HC RX 250 WO HCPCS: Performed by: NURSE ANESTHETIST, CERTIFIED REGISTERED

## 2020-11-02 PROCEDURE — 80048 BASIC METABOLIC PNL TOTAL CA: CPT

## 2020-11-02 PROCEDURE — 76942 ECHO GUIDE FOR BIOPSY: CPT | Performed by: FAMILY MEDICINE

## 2020-11-02 PROCEDURE — G0378 HOSPITAL OBSERVATION PER HR: HCPCS

## 2020-11-02 PROCEDURE — C1776 JOINT DEVICE (IMPLANTABLE): HCPCS | Performed by: ORTHOPAEDIC SURGERY

## 2020-11-02 PROCEDURE — 2500000003 HC RX 250 WO HCPCS: Performed by: ORTHOPAEDIC SURGERY

## 2020-11-02 PROCEDURE — 85027 COMPLETE CBC AUTOMATED: CPT

## 2020-11-02 PROCEDURE — 36415 COLL VENOUS BLD VENIPUNCTURE: CPT

## 2020-11-02 PROCEDURE — 6360000002 HC RX W HCPCS: Performed by: ORTHOPAEDIC SURGERY

## 2020-11-02 PROCEDURE — 2580000003 HC RX 258: Performed by: ORTHOPAEDIC SURGERY

## 2020-11-02 PROCEDURE — 2580000003 HC RX 258: Performed by: NURSE ANESTHETIST, CERTIFIED REGISTERED

## 2020-11-02 PROCEDURE — 1200000000 HC SEMI PRIVATE

## 2020-11-02 PROCEDURE — 2709999900 HC NON-CHARGEABLE SUPPLY: Performed by: ORTHOPAEDIC SURGERY

## 2020-11-02 PROCEDURE — C1713 ANCHOR/SCREW BN/BN,TIS/BN: HCPCS | Performed by: ORTHOPAEDIC SURGERY

## 2020-11-02 PROCEDURE — 97165 OT EVAL LOW COMPLEX 30 MIN: CPT

## 2020-11-02 PROCEDURE — 86901 BLOOD TYPING SEROLOGIC RH(D): CPT

## 2020-11-02 PROCEDURE — 3600000005 HC SURGERY LEVEL 5 BASE: Performed by: ORTHOPAEDIC SURGERY

## 2020-11-02 PROCEDURE — 86900 BLOOD TYPING SEROLOGIC ABO: CPT

## 2020-11-02 PROCEDURE — 7100000001 HC PACU RECOVERY - ADDTL 15 MIN: Performed by: ORTHOPAEDIC SURGERY

## 2020-11-02 PROCEDURE — 3600000015 HC SURGERY LEVEL 5 ADDTL 15MIN: Performed by: ORTHOPAEDIC SURGERY

## 2020-11-02 PROCEDURE — 6370000000 HC RX 637 (ALT 250 FOR IP): Performed by: FAMILY MEDICINE

## 2020-11-02 PROCEDURE — 97161 PT EVAL LOW COMPLEX 20 MIN: CPT

## 2020-11-02 PROCEDURE — 86850 RBC ANTIBODY SCREEN: CPT

## 2020-11-02 PROCEDURE — 64447 NJX AA&/STRD FEMORAL NRV IMG: CPT | Performed by: FAMILY MEDICINE

## 2020-11-02 PROCEDURE — 6370000000 HC RX 637 (ALT 250 FOR IP): Performed by: INTERNAL MEDICINE

## 2020-11-02 PROCEDURE — 6360000002 HC RX W HCPCS: Performed by: NURSE ANESTHETIST, CERTIFIED REGISTERED

## 2020-11-02 PROCEDURE — 2720000010 HC SURG SUPPLY STERILE: Performed by: ORTHOPAEDIC SURGERY

## 2020-11-02 PROCEDURE — APPNB30 APP NON BILLABLE TIME 0-30 MINS: Performed by: NURSE PRACTITIONER

## 2020-11-02 PROCEDURE — 3700000001 HC ADD 15 MINUTES (ANESTHESIA): Performed by: ORTHOPAEDIC SURGERY

## 2020-11-02 PROCEDURE — 3700000000 HC ANESTHESIA ATTENDED CARE: Performed by: ORTHOPAEDIC SURGERY

## 2020-11-02 PROCEDURE — 99024 POSTOP FOLLOW-UP VISIT: CPT | Performed by: NURSE PRACTITIONER

## 2020-11-02 PROCEDURE — 97535 SELF CARE MNGMENT TRAINING: CPT

## 2020-11-02 PROCEDURE — 73560 X-RAY EXAM OF KNEE 1 OR 2: CPT

## 2020-11-02 PROCEDURE — 6370000000 HC RX 637 (ALT 250 FOR IP): Performed by: ORTHOPAEDIC SURGERY

## 2020-11-02 DEVICE — BASEPLATE TIB SZ 4 KNEE ROT PLATFRM CEM SYS ATTUNE: Type: IMPLANTABLE DEVICE | Site: KNEE | Status: FUNCTIONAL

## 2020-11-02 DEVICE — INSERT TIB SZ 4 THK5MM KNEE POST STBL ROT PLATFRM ATTUNE: Type: IMPLANTABLE DEVICE | Site: KNEE | Status: FUNCTIONAL

## 2020-11-02 DEVICE — COMPONENT PAT DIA35MM KNEE POLY CEM MEDIALIZED ANAT ATTUNE: Type: IMPLANTABLE DEVICE | Site: PATELLA | Status: FUNCTIONAL

## 2020-11-02 DEVICE — CEMENT BNE 40GM FULL DOSE PMMA W/ GENT HI VISC RADPQ LNG: Type: IMPLANTABLE DEVICE | Site: KNEE | Status: FUNCTIONAL

## 2020-11-02 DEVICE — COMPONENT FEM SZ 4 R KNEE NAR POST STBL CEM ATTUNE: Type: IMPLANTABLE DEVICE | Site: KNEE | Status: FUNCTIONAL

## 2020-11-02 RX ORDER — ONDANSETRON 2 MG/ML
4 INJECTION INTRAMUSCULAR; INTRAVENOUS EVERY 6 HOURS PRN
Status: DISCONTINUED | OUTPATIENT
Start: 2020-11-02 | End: 2020-11-03 | Stop reason: HOSPADM

## 2020-11-02 RX ORDER — SENNA AND DOCUSATE SODIUM 50; 8.6 MG/1; MG/1
1 TABLET, FILM COATED ORAL 2 TIMES DAILY
Status: DISCONTINUED | OUTPATIENT
Start: 2020-11-02 | End: 2020-11-03 | Stop reason: HOSPADM

## 2020-11-02 RX ORDER — OXYCODONE HYDROCHLORIDE 5 MG/1
10 TABLET ORAL EVERY 4 HOURS PRN
Status: DISCONTINUED | OUTPATIENT
Start: 2020-11-02 | End: 2020-11-03 | Stop reason: HOSPADM

## 2020-11-02 RX ORDER — POTASSIUM CHLORIDE 7.45 MG/ML
10 INJECTION INTRAVENOUS PRN
Status: DISCONTINUED | OUTPATIENT
Start: 2020-11-02 | End: 2020-11-03 | Stop reason: HOSPADM

## 2020-11-02 RX ORDER — POTASSIUM CHLORIDE 20 MEQ/1
40 TABLET, EXTENDED RELEASE ORAL PRN
Status: DISCONTINUED | OUTPATIENT
Start: 2020-11-02 | End: 2020-11-03 | Stop reason: HOSPADM

## 2020-11-02 RX ORDER — INSULIN LISPRO 100 [IU]/ML
0-12 INJECTION, SOLUTION INTRAVENOUS; SUBCUTANEOUS
Status: DISCONTINUED | OUTPATIENT
Start: 2020-11-03 | End: 2020-11-03 | Stop reason: HOSPADM

## 2020-11-02 RX ORDER — PROMETHAZINE HYDROCHLORIDE 25 MG/ML
6.25 INJECTION, SOLUTION INTRAMUSCULAR; INTRAVENOUS PRN
Status: DISCONTINUED | OUTPATIENT
Start: 2020-11-02 | End: 2020-11-02

## 2020-11-02 RX ORDER — LEVOTHYROXINE SODIUM 88 UG/1
88 TABLET ORAL DAILY
Status: DISCONTINUED | OUTPATIENT
Start: 2020-11-03 | End: 2020-11-03 | Stop reason: HOSPADM

## 2020-11-02 RX ORDER — CELECOXIB 200 MG/1
200 CAPSULE ORAL DAILY
Status: DISCONTINUED | OUTPATIENT
Start: 2020-11-02 | End: 2020-11-03 | Stop reason: HOSPADM

## 2020-11-02 RX ORDER — LIDOCAINE HYDROCHLORIDE 20 MG/ML
INJECTION, SOLUTION INFILTRATION; PERINEURAL PRN
Status: DISCONTINUED | OUTPATIENT
Start: 2020-11-02 | End: 2020-11-02 | Stop reason: SDUPTHER

## 2020-11-02 RX ORDER — SODIUM CHLORIDE 0.9 % (FLUSH) 0.9 %
10 SYRINGE (ML) INJECTION PRN
Status: DISCONTINUED | OUTPATIENT
Start: 2020-11-02 | End: 2020-11-03 | Stop reason: HOSPADM

## 2020-11-02 RX ORDER — LIDOCAINE HYDROCHLORIDE 10 MG/ML
0.5 INJECTION, SOLUTION EPIDURAL; INFILTRATION; INTRACAUDAL; PERINEURAL ONCE
Status: DISCONTINUED | OUTPATIENT
Start: 2020-11-02 | End: 2020-11-02

## 2020-11-02 RX ORDER — INSULIN LISPRO 100 [IU]/ML
0-6 INJECTION, SOLUTION INTRAVENOUS; SUBCUTANEOUS NIGHTLY
Status: DISCONTINUED | OUTPATIENT
Start: 2020-11-02 | End: 2020-11-03 | Stop reason: HOSPADM

## 2020-11-02 RX ORDER — DEXAMETHASONE SODIUM PHOSPHATE 4 MG/ML
8 INJECTION, SOLUTION INTRA-ARTICULAR; INTRALESIONAL; INTRAMUSCULAR; INTRAVENOUS; SOFT TISSUE ONCE
Status: COMPLETED | OUTPATIENT
Start: 2020-11-03 | End: 2020-11-03

## 2020-11-02 RX ORDER — DEXAMETHASONE SODIUM PHOSPHATE 4 MG/ML
INJECTION, SOLUTION INTRA-ARTICULAR; INTRALESIONAL; INTRAMUSCULAR; INTRAVENOUS; SOFT TISSUE PRN
Status: DISCONTINUED | OUTPATIENT
Start: 2020-11-02 | End: 2020-11-02 | Stop reason: SDUPTHER

## 2020-11-02 RX ORDER — OXYCODONE HYDROCHLORIDE 5 MG/1
10 TABLET ORAL PRN
Status: COMPLETED | OUTPATIENT
Start: 2020-11-02 | End: 2020-11-02

## 2020-11-02 RX ORDER — SODIUM CHLORIDE, SODIUM LACTATE, POTASSIUM CHLORIDE, CALCIUM CHLORIDE 600; 310; 30; 20 MG/100ML; MG/100ML; MG/100ML; MG/100ML
INJECTION, SOLUTION INTRAVENOUS CONTINUOUS
Status: DISCONTINUED | OUTPATIENT
Start: 2020-11-02 | End: 2020-11-02

## 2020-11-02 RX ORDER — DEXTROSE MONOHYDRATE 25 G/50ML
12.5 INJECTION, SOLUTION INTRAVENOUS PRN
Status: DISCONTINUED | OUTPATIENT
Start: 2020-11-02 | End: 2020-11-03 | Stop reason: HOSPADM

## 2020-11-02 RX ORDER — DIPHENHYDRAMINE HYDROCHLORIDE 50 MG/ML
12.5 INJECTION INTRAMUSCULAR; INTRAVENOUS
Status: DISCONTINUED | OUTPATIENT
Start: 2020-11-02 | End: 2020-11-02

## 2020-11-02 RX ORDER — GLYCOPYRROLATE 0.2 MG/ML
INJECTION INTRAMUSCULAR; INTRAVENOUS PRN
Status: DISCONTINUED | OUTPATIENT
Start: 2020-11-02 | End: 2020-11-02 | Stop reason: SDUPTHER

## 2020-11-02 RX ORDER — ONDANSETRON 2 MG/ML
INJECTION INTRAMUSCULAR; INTRAVENOUS PRN
Status: DISCONTINUED | OUTPATIENT
Start: 2020-11-02 | End: 2020-11-02 | Stop reason: SDUPTHER

## 2020-11-02 RX ORDER — NICOTINE POLACRILEX 4 MG
15 LOZENGE BUCCAL PRN
Status: DISCONTINUED | OUTPATIENT
Start: 2020-11-02 | End: 2020-11-03 | Stop reason: HOSPADM

## 2020-11-02 RX ORDER — LABETALOL HYDROCHLORIDE 5 MG/ML
5 INJECTION, SOLUTION INTRAVENOUS EVERY 10 MIN PRN
Status: DISCONTINUED | OUTPATIENT
Start: 2020-11-02 | End: 2020-11-02

## 2020-11-02 RX ORDER — MEPERIDINE HYDROCHLORIDE 25 MG/ML
12.5 INJECTION INTRAMUSCULAR; INTRAVENOUS; SUBCUTANEOUS EVERY 5 MIN PRN
Status: DISCONTINUED | OUTPATIENT
Start: 2020-11-02 | End: 2020-11-02

## 2020-11-02 RX ORDER — DEXTROSE MONOHYDRATE 50 MG/ML
100 INJECTION, SOLUTION INTRAVENOUS PRN
Status: DISCONTINUED | OUTPATIENT
Start: 2020-11-02 | End: 2020-11-03 | Stop reason: HOSPADM

## 2020-11-02 RX ORDER — SODIUM CHLORIDE 9 MG/ML
INJECTION, SOLUTION INTRAVENOUS CONTINUOUS PRN
Status: DISCONTINUED | OUTPATIENT
Start: 2020-11-02 | End: 2020-11-02

## 2020-11-02 RX ORDER — OXYCODONE HYDROCHLORIDE 5 MG/1
5 TABLET ORAL EVERY 4 HOURS PRN
Status: DISCONTINUED | OUTPATIENT
Start: 2020-11-02 | End: 2020-11-03 | Stop reason: HOSPADM

## 2020-11-02 RX ORDER — HYDROMORPHONE HCL 110MG/55ML
0.25 PATIENT CONTROLLED ANALGESIA SYRINGE INTRAVENOUS EVERY 5 MIN PRN
Status: DISCONTINUED | OUTPATIENT
Start: 2020-11-02 | End: 2020-11-02

## 2020-11-02 RX ORDER — PROPOFOL 10 MG/ML
INJECTION, EMULSION INTRAVENOUS PRN
Status: DISCONTINUED | OUTPATIENT
Start: 2020-11-02 | End: 2020-11-02 | Stop reason: SDUPTHER

## 2020-11-02 RX ORDER — FENTANYL CITRATE 50 UG/ML
50 INJECTION, SOLUTION INTRAMUSCULAR; INTRAVENOUS EVERY 5 MIN PRN
Status: DISCONTINUED | OUTPATIENT
Start: 2020-11-02 | End: 2020-11-02

## 2020-11-02 RX ORDER — BUPIVACAINE HYDROCHLORIDE 7.5 MG/ML
INJECTION, SOLUTION INTRASPINAL PRN
Status: DISCONTINUED | OUTPATIENT
Start: 2020-11-02 | End: 2020-11-02 | Stop reason: SDUPTHER

## 2020-11-02 RX ORDER — SODIUM CHLORIDE, SODIUM LACTATE, POTASSIUM CHLORIDE, CALCIUM CHLORIDE 600; 310; 30; 20 MG/100ML; MG/100ML; MG/100ML; MG/100ML
INJECTION, SOLUTION INTRAVENOUS CONTINUOUS
Status: DISCONTINUED | OUTPATIENT
Start: 2020-11-02 | End: 2020-11-03 | Stop reason: HOSPADM

## 2020-11-02 RX ORDER — ACETAMINOPHEN 325 MG/1
650 TABLET ORAL EVERY 6 HOURS
Status: DISCONTINUED | OUTPATIENT
Start: 2020-11-02 | End: 2020-11-03 | Stop reason: HOSPADM

## 2020-11-02 RX ORDER — SODIUM CHLORIDE, SODIUM LACTATE, POTASSIUM CHLORIDE, CALCIUM CHLORIDE 600; 310; 30; 20 MG/100ML; MG/100ML; MG/100ML; MG/100ML
INJECTION, SOLUTION INTRAVENOUS CONTINUOUS PRN
Status: DISCONTINUED | OUTPATIENT
Start: 2020-11-02 | End: 2020-11-02 | Stop reason: SDUPTHER

## 2020-11-02 RX ORDER — HYDROMORPHONE HCL 110MG/55ML
0.5 PATIENT CONTROLLED ANALGESIA SYRINGE INTRAVENOUS
Status: DISCONTINUED | OUTPATIENT
Start: 2020-11-02 | End: 2020-11-03 | Stop reason: HOSPADM

## 2020-11-02 RX ORDER — 0.9 % SODIUM CHLORIDE 0.9 %
500 INTRAVENOUS SOLUTION INTRAVENOUS PRN
Status: DISCONTINUED | OUTPATIENT
Start: 2020-11-02 | End: 2020-11-03 | Stop reason: HOSPADM

## 2020-11-02 RX ORDER — PROPOFOL 10 MG/ML
INJECTION, EMULSION INTRAVENOUS CONTINUOUS PRN
Status: DISCONTINUED | OUTPATIENT
Start: 2020-11-02 | End: 2020-11-02 | Stop reason: SDUPTHER

## 2020-11-02 RX ORDER — HYDROMORPHONE HCL 110MG/55ML
0.25 PATIENT CONTROLLED ANALGESIA SYRINGE INTRAVENOUS
Status: DISCONTINUED | OUTPATIENT
Start: 2020-11-02 | End: 2020-11-03 | Stop reason: HOSPADM

## 2020-11-02 RX ORDER — PROMETHAZINE HYDROCHLORIDE 25 MG/1
12.5 TABLET ORAL EVERY 6 HOURS PRN
Status: DISCONTINUED | OUTPATIENT
Start: 2020-11-02 | End: 2020-11-03 | Stop reason: HOSPADM

## 2020-11-02 RX ORDER — SODIUM CHLORIDE 0.9 % (FLUSH) 0.9 %
10 SYRINGE (ML) INJECTION EVERY 12 HOURS SCHEDULED
Status: DISCONTINUED | OUTPATIENT
Start: 2020-11-02 | End: 2020-11-03 | Stop reason: HOSPADM

## 2020-11-02 RX ORDER — OXYCODONE HYDROCHLORIDE 5 MG/1
5 TABLET ORAL PRN
Status: COMPLETED | OUTPATIENT
Start: 2020-11-02 | End: 2020-11-02

## 2020-11-02 RX ORDER — HYDROMORPHONE HCL 110MG/55ML
0.5 PATIENT CONTROLLED ANALGESIA SYRINGE INTRAVENOUS EVERY 5 MIN PRN
Status: DISCONTINUED | OUTPATIENT
Start: 2020-11-02 | End: 2020-11-02

## 2020-11-02 RX ADMIN — PHENYLEPHRINE HYDROCHLORIDE 25 MCG: 10 INJECTION INTRAVENOUS at 09:16

## 2020-11-02 RX ADMIN — PHENYLEPHRINE HYDROCHLORIDE 100 MCG: 10 INJECTION INTRAVENOUS at 10:10

## 2020-11-02 RX ADMIN — PROPOFOL 20 MG: 10 INJECTION, EMULSION INTRAVENOUS at 08:47

## 2020-11-02 RX ADMIN — SODIUM CHLORIDE, POTASSIUM CHLORIDE, SODIUM LACTATE AND CALCIUM CHLORIDE: 600; 310; 30; 20 INJECTION, SOLUTION INTRAVENOUS at 21:46

## 2020-11-02 RX ADMIN — Medication 10 ML: at 21:50

## 2020-11-02 RX ADMIN — INSULIN LISPRO 1 UNITS: 100 INJECTION, SOLUTION INTRAVENOUS; SUBCUTANEOUS at 23:30

## 2020-11-02 RX ADMIN — ASPIRIN 325 MG: 325 TABLET, COATED ORAL at 21:44

## 2020-11-02 RX ADMIN — PHENYLEPHRINE HYDROCHLORIDE 100 MCG: 10 INJECTION INTRAVENOUS at 08:55

## 2020-11-02 RX ADMIN — PROPOFOL 20 MG: 10 INJECTION, EMULSION INTRAVENOUS at 08:50

## 2020-11-02 RX ADMIN — PHENYLEPHRINE HYDROCHLORIDE 50 MCG: 10 INJECTION INTRAVENOUS at 09:58

## 2020-11-02 RX ADMIN — PHENYLEPHRINE HYDROCHLORIDE 50 MCG: 10 INJECTION INTRAVENOUS at 10:03

## 2020-11-02 RX ADMIN — ONDANSETRON 4 MG: 2 INJECTION INTRAMUSCULAR; INTRAVENOUS at 08:55

## 2020-11-02 RX ADMIN — PROPOFOL 80 MCG/KG/MIN: 10 INJECTION, EMULSION INTRAVENOUS at 08:41

## 2020-11-02 RX ADMIN — PHENYLEPHRINE HYDROCHLORIDE 100 MCG: 10 INJECTION INTRAVENOUS at 10:06

## 2020-11-02 RX ADMIN — BUPIVACAINE HYDROCHLORIDE 2 ML: 7.5 INJECTION, SOLUTION INTRASPINAL at 08:39

## 2020-11-02 RX ADMIN — PHENYLEPHRINE HYDROCHLORIDE 25 MCG: 10 INJECTION INTRAVENOUS at 09:43

## 2020-11-02 RX ADMIN — DEXAMETHASONE SODIUM PHOSPHATE 8 MG: 4 INJECTION, SOLUTION INTRAMUSCULAR; INTRAVENOUS at 08:50

## 2020-11-02 RX ADMIN — TRANEXAMIC ACID 1000 MG: 1 INJECTION, SOLUTION INTRAVENOUS at 10:00

## 2020-11-02 RX ADMIN — PHENYLEPHRINE HYDROCHLORIDE 25 MCG: 10 INJECTION INTRAVENOUS at 09:22

## 2020-11-02 RX ADMIN — PHENYLEPHRINE HYDROCHLORIDE 50 MCG: 10 INJECTION INTRAVENOUS at 09:08

## 2020-11-02 RX ADMIN — CEFAZOLIN SODIUM 2 G: 10 INJECTION, POWDER, FOR SOLUTION INTRAVENOUS at 08:30

## 2020-11-02 RX ADMIN — CELECOXIB 200 MG: 200 CAPSULE ORAL at 16:34

## 2020-11-02 RX ADMIN — LIDOCAINE HYDROCHLORIDE 100 MG: 20 INJECTION, SOLUTION INFILTRATION; PERINEURAL at 08:41

## 2020-11-02 RX ADMIN — TRANEXAMIC ACID 1000 MG: 1 INJECTION, SOLUTION INTRAVENOUS at 08:26

## 2020-11-02 RX ADMIN — OXYCODONE 10 MG: 5 TABLET ORAL at 21:44

## 2020-11-02 RX ADMIN — GLYCOPYRROLATE 0.2 MG: 0.2 INJECTION INTRAMUSCULAR; INTRAVENOUS at 08:41

## 2020-11-02 RX ADMIN — SODIUM CHLORIDE, POTASSIUM CHLORIDE, SODIUM LACTATE AND CALCIUM CHLORIDE: 600; 310; 30; 20 INJECTION, SOLUTION INTRAVENOUS at 07:30

## 2020-11-02 RX ADMIN — PHENYLEPHRINE HYDROCHLORIDE 25 MCG: 10 INJECTION INTRAVENOUS at 09:50

## 2020-11-02 RX ADMIN — ACETAMINOPHEN 650 MG: 325 TABLET ORAL at 16:35

## 2020-11-02 RX ADMIN — PHENYLEPHRINE HYDROCHLORIDE 25 MCG: 10 INJECTION INTRAVENOUS at 09:26

## 2020-11-02 RX ADMIN — PROPOFOL 30 MG: 10 INJECTION, EMULSION INTRAVENOUS at 08:44

## 2020-11-02 RX ADMIN — BUPIVACAINE HYDROCHLORIDE AND EPINEPHRINE BITARTRATE: 2.5; .005 INJECTION, SOLUTION EPIDURAL; INFILTRATION; INTRACAUDAL; PERINEURAL at 09:45

## 2020-11-02 RX ADMIN — ACETAMINOPHEN 650 MG: 325 TABLET ORAL at 21:44

## 2020-11-02 RX ADMIN — CEFAZOLIN 2 G: 10 INJECTION, POWDER, FOR SOLUTION INTRAVENOUS at 23:28

## 2020-11-02 RX ADMIN — SODIUM CHLORIDE, POTASSIUM CHLORIDE, SODIUM LACTATE AND CALCIUM CHLORIDE: 600; 310; 30; 20 INJECTION, SOLUTION INTRAVENOUS at 16:36

## 2020-11-02 RX ADMIN — PROPOFOL 30 MG: 10 INJECTION, EMULSION INTRAVENOUS at 08:41

## 2020-11-02 RX ADMIN — SODIUM CHLORIDE, POTASSIUM CHLORIDE, SODIUM LACTATE AND CALCIUM CHLORIDE: 600; 310; 30; 20 INJECTION, SOLUTION INTRAVENOUS at 06:52

## 2020-11-02 RX ADMIN — OXYCODONE HYDROCHLORIDE 5 MG: 5 TABLET ORAL at 13:33

## 2020-11-02 RX ADMIN — PHENYLEPHRINE HYDROCHLORIDE 25 MCG: 10 INJECTION INTRAVENOUS at 09:37

## 2020-11-02 RX ADMIN — PHENYLEPHRINE HYDROCHLORIDE 100 MCG: 10 INJECTION INTRAVENOUS at 08:52

## 2020-11-02 RX ADMIN — PHENYLEPHRINE HYDROCHLORIDE 100 MCG: 10 INJECTION INTRAVENOUS at 10:16

## 2020-11-02 RX ADMIN — STANDARDIZED SENNA CONCENTRATE AND DOCUSATE SODIUM 1 TABLET: 8.6; 5 TABLET ORAL at 21:44

## 2020-11-02 RX ADMIN — CEFAZOLIN 2 G: 10 INJECTION, POWDER, FOR SOLUTION INTRAVENOUS at 16:35

## 2020-11-02 RX ADMIN — PHENYLEPHRINE HYDROCHLORIDE 100 MCG: 10 INJECTION INTRAVENOUS at 08:58

## 2020-11-02 RX ADMIN — PHENYLEPHRINE HYDROCHLORIDE 100 MCG: 10 INJECTION INTRAVENOUS at 08:48

## 2020-11-02 ASSESSMENT — ENCOUNTER SYMPTOMS
SHORTNESS OF BREATH: 0
FACIAL SWELLING: 0
BACK PAIN: 0
VOMITING: 0
COUGH: 0
EYE PAIN: 0
NAUSEA: 0
EYE REDNESS: 0

## 2020-11-02 ASSESSMENT — PULMONARY FUNCTION TESTS
PIF_VALUE: 1
PIF_VALUE: 2
PIF_VALUE: 1
PIF_VALUE: 0
PIF_VALUE: 1
PIF_VALUE: 0
PIF_VALUE: 1
PIF_VALUE: 0
PIF_VALUE: 1
PIF_VALUE: 0
PIF_VALUE: 1
PIF_VALUE: 0
PIF_VALUE: 1
PIF_VALUE: 0
PIF_VALUE: 1
PIF_VALUE: 0
PIF_VALUE: 1
PIF_VALUE: 0
PIF_VALUE: 1
PIF_VALUE: 0
PIF_VALUE: 0
PIF_VALUE: 1
PIF_VALUE: 1
PIF_VALUE: 0
PIF_VALUE: 1
PIF_VALUE: 0
PIF_VALUE: 1
PIF_VALUE: 0
PIF_VALUE: 0
PIF_VALUE: 1
PIF_VALUE: 0
PIF_VALUE: 1

## 2020-11-02 ASSESSMENT — PAIN DESCRIPTION - PAIN TYPE: TYPE: SURGICAL PAIN

## 2020-11-02 ASSESSMENT — PAIN DESCRIPTION - LOCATION: LOCATION: KNEE

## 2020-11-02 ASSESSMENT — PAIN DESCRIPTION - DESCRIPTORS
DESCRIPTORS: ACHING;CRAMPING
DESCRIPTORS: ACHING

## 2020-11-02 ASSESSMENT — PAIN DESCRIPTION - ONSET: ONSET: ON-GOING

## 2020-11-02 ASSESSMENT — PAIN DESCRIPTION - ORIENTATION: ORIENTATION: RIGHT

## 2020-11-02 ASSESSMENT — PAIN SCALES - GENERAL
PAINLEVEL_OUTOF10: 3
PAINLEVEL_OUTOF10: 8
PAINLEVEL_OUTOF10: 8
PAINLEVEL_OUTOF10: 0
PAINLEVEL_OUTOF10: 3
PAINLEVEL_OUTOF10: 5
PAINLEVEL_OUTOF10: 2

## 2020-11-02 ASSESSMENT — PAIN DESCRIPTION - PROGRESSION
CLINICAL_PROGRESSION: NOT CHANGED
CLINICAL_PROGRESSION: NOT CHANGED

## 2020-11-02 ASSESSMENT — PAIN DESCRIPTION - FREQUENCY: FREQUENCY: INTERMITTENT

## 2020-11-02 ASSESSMENT — PAIN - FUNCTIONAL ASSESSMENT
PAIN_FUNCTIONAL_ASSESSMENT: PREVENTS OR INTERFERES SOME ACTIVE ACTIVITIES AND ADLS
PAIN_FUNCTIONAL_ASSESSMENT: 0-10

## 2020-11-02 NOTE — DISCHARGE SUMMARY
Patient ID:  Asa Redman  [id number]  1955    Admit date: 11/2/2020    Discharge date: 11/3/2020  1:05 PM    Attending Physician: Smitha Rangel MD     Admission Diagnoses:   Preop testing [B14.530]  Primary osteoarthritis of right knee [M17.11]    Discharge Diagnoses:   Principal Problem:    11/2/20 RIGHT TKA  Active Problems:    Hypothyroidism    Vitamin D deficiency    Sensorineural hearing loss, bilateral  Resolved Problems:    * No resolved hospital problems. *    Past Medical History:   Diagnosis Date    Kidney stone     Pneumonia     Thyroid disease      Indication for Admission: Asa Redman is a 76 y.o. female who presented with a history of RIGHT knee osteoarthritis unresponsive to conservative treatment. PMH includes obesity (BMI 39), thyroid disease. Operations/Procedures Performed:   1. RIGHT total knee arthroplasty    Hospital Course: Patient admitted on 11/2/2020 and underwent abovementioned procedure(s) on 11/2/20. Tolerated the procedure well with no complications. Please see full operative report for further details regarding the operation. Postoperatively transferred to the floor in stable condition. Pain controlled post-op with IV/oral pain medication. Diet was advanced and tolerated this well. At time of discharge, the patient was tolerating oral food and hydration, voiding spontaneously, had return of bowel function, was ambulating without difficulty, and pain was controlled on oral medications. The patient was determined to be suitable for discharge and the patient felt comfortable with that decision. Consults: Internal Medicine, Physical and Occupational Therapy, Social Work    Significant Diagnostic Studies:   11/2/20 RIGHT knee xray:  Postoperative film right total knee arthroplasty demonstrates good alignment.     Discharge Exam:  /69   Pulse 65   Temp 97.8 °F (36.6 °C) (Oral)   Resp 16   Ht 5' (1.524 m)   Wt 198 lb (89.8 kg)   SpO2 96% Breastfeeding No   BMI 38.67 kg/m²     Gen - NAD, AOx3   - voiding spontaneously  Ext - RIGHT knee overdressing c/d/i  RIGHT LE NVI  Discharge condition:  Stable    Discharge Medications:  ALL MEDICATIONS HAVE BEEN REVIEWED:  Discharge Medication List as of 11/3/2020 11:18 AM      START taking these medications    Details   oxyCODONE (ROXICODONE) 5 MG immediate release tablet Take 1-2 tablets by mouth every 4 hours as needed for Pain for up to 7 days. , Disp-42 tablet,R-0Print      acetaminophen (TYLENOL) 325 MG tablet Take 2 tablets by mouth every 6 hours for 28 days, Disp-168 tablet,R-0Print      aspirin 325 MG EC tablet Take 1 tablet by mouth 2 times daily for 14 days, Disp-28 tablet,R-0Print      celecoxib (CELEBREX) 200 MG capsule Take 1 capsule by mouth daily for 28 days, Disp-28 capsule,R-0Print         CONTINUE these medications which have NOT CHANGED    Details   levothyroxine (SYNTHROID) 88 MCG tablet Take 1 tablet by mouth Daily, Disp-90 tablet, R-3Normal      vitamin D (ERGOCALCIFEROL) 74310 UNITS CAPS capsule Take 1 capsule by mouth once a week, Disp-13 capsule, R-0Normal      POTASSIUM CITRATE-CITRIC ACID PO Take 1,080 mg by mouth. STOP taking these medications       meloxicam (MOBIC) 15 MG tablet Comments:   Reason for Stopping:             Due to patient's orthopaedic surgical procedure/condition, patient may require pain medication for up to 6-8 weeks. Disposition: home with home PT    Patient Instructions: Activity: activity as tolerated  Wound Care: Remove overdressing in 1 week;  Maintain Prineo dressing until first post op office visit  Anticoagulation:  ASA BID    Follow-Up:  Patient to contact Dr. Arvizu Prior  to schedule two week post op appointment.       SOFIYA ANN-CNP  11/3/2020  2:30 PM    CC: PCP

## 2020-11-02 NOTE — ANESTHESIA PRE PROCEDURE
Department of Anesthesiology  Preprocedure Note       Name:  Tommy Echeverria   Age:  76 y.o.  :  1955                                          MRN:  [id number]         Date:  2020      Surgeon: Lee Page):  Mo Schneider MD    Procedure: Procedure(s):  RIGHT TOTAL KNEE ARTHROPLASTY - DEPUY ADVANCED    Medications prior to admission:   Prior to Admission medications    Medication Sig Start Date End Date Taking? Authorizing Provider   levothyroxine (SYNTHROID) 88 MCG tablet Take 1 tablet by mouth Daily 20  Yes Sam Pedro DO   vitamin D (ERGOCALCIFEROL) 23209 UNITS CAPS capsule Take 1 capsule by mouth once a week 5/3/17  Yes Sam Pedro DO   POTASSIUM CITRATE-CITRIC ACID PO Take 1,080 mg by mouth.    Yes Historical Provider, MD   meloxicam (MOBIC) 15 MG tablet Take 1 tablet by mouth daily 19   Shauna Johnston DO       Current medications:    Current Facility-Administered Medications   Medication Dose Route Frequency Provider Last Rate Last Dose    lidocaine PF 1 % injection 0.5 mL  0.5 mL Subcutaneous Once Mo Schneider MD        lactated ringers infusion   Intravenous Continuous Mo Schneider MD        ceFAZolin (ANCEF) 2 g in dextrose 5 % 100 mL IVPB  2 g Intravenous On Call to [facility] [address], MD        ortho mix injection   Injection On Call Mo Schneider MD        tranexamic acid (CYKLOKAPRON) 1,000 mg in sodium chloride 0.9 % 50 mL IVPB  1,000 mg Intravenous On Call to [facility] [address], MD        tranexamic acid (CYKLOKAPRON) 1,000 mg in sodium chloride 0.9 % 50 mL IVPB  1,000 mg Intravenous Once Mo Schneider MD        HYDROmorphone (DILAUDID) injection 0.25 mg  0.25 mg Intravenous Q5 Min PRN Lauri Ladd MD        fentaNYL (SUBLIMAZE) injection 50 mcg  50 mcg Intravenous Q5 Min PRN Lauri Ladd MD        HYDROmorphone (DILAUDID) injection 0.25 mg  0.25 mg Intravenous Q5 Min PRN MD Nghia Lan HYDROmorphone (DILAUDID) injection 0.5 mg  0.5 mg Intravenous Q5 Min PRN Brandon Olivares MD        oxyCODONE (ROXICODONE) immediate release tablet 5 mg  5 mg Oral PRN Brandon Olivares MD        Or    oxyCODONE (ROXICODONE) immediate release tablet 10 mg  10 mg Oral PRN Brandon Olivares MD        diphenhydrAMINE (BENADRYL) injection 12.5 mg  12.5 mg Intravenous Once PRN Brandon Olivares MD        promethazine (PHENERGAN) injection 6.25 mg  6.25 mg Intravenous PRN Brandon Olivares MD        labetalol (NORMODYNE;TRANDATE) injection 5 mg  5 mg Intravenous Q10 Min PRN Brandon Olivares MD        meperidine (DEMEROL) injection 12.5 mg  12.5 mg Intravenous Q5 Min PRN Brandon Olivares MD           Allergies:     Allergies   Allergen Reactions    Penicillins Rash       Problem List:    Patient Active Problem List   Diagnosis Code    Ureteral calculi--calcium oxalate; left ureteral N20.1    Adult BMI 30+ [id number]    Hypothyroidism E03.9    10 year risk of MI or stroke < 7.5% Z91.89    Alkaline phosphatase raised R74.8    Vitamin D deficiency E55.9    Abnormal GGT test R74.8    Primary osteoarthritis of right knee M17.11    Sensorineural hearing loss, bilateral H90.3       Past Medical History:        Diagnosis Date    Kidney stone     Pneumonia     Thyroid disease        Past Surgical History:        Procedure Laterality Date    EYE MUSCLE SURGERY      age 14     LITHOTRIPSY      2012, 2013    [address]       Social History:    Social History     Tobacco Use    Smoking status: Former Smoker    Smokeless tobacco: Never Used   Substance Use Topics    Alcohol use: Not Currently                                Counseling given: Not Answered      Vital Signs (Current):   Vitals:    10/06/20 1348 11/02/20 0616   Weight: 194 lb (88 kg) 198 lb (89.8 kg)   Height: 5' (1.524 m) 5' (1.524 m)                                              BP Readings from Last 3 Encounters:   10/19/20 (!) 144/82   01/27/20 132/80

## 2020-11-02 NOTE — PROGRESS NOTES
Met with patient at bedside, patient is alert and oriented. discussed role of nurse navigator and gave contact information.  Reviewed reasons to call with questions or concerns, importance of TEDS, Incentive spirometer, pain medication, and physical and occupational therapy. 2/4 bed rails up, bed in lowest position, fall precautions in place, call light within reach.      Pulses present bilaterally +2, no drainage or odor noted at surgical dressing right knee. Dressing clean, dry, and intact. Ice in place.  Damon and scds on left leg leg. Neurovascular checks performed and WNLs, patient denies numbness or tingling.     DC Plan: home care. friend to transport patient.   DME needs:has evelyn Ibarra  Orthopedic Nurse Navigator  Phone number: [phone]

## 2020-11-02 NOTE — PROGRESS NOTES
Pt arrived to PACU from OR in Stable condition and is RASS -1 (Drowsy) . Respirations are Regular Pattern; RR 8-20 = 20 on 3L O2 per nasal cannula. Skin warm and dry. Abd is  soft. Pain: denies at this time. YL18. Right knee surgical site(s) intact with dressing= clean, dry and intact. Will continue to monitor for safety and comfort. S/P: Right total knee replacement, with Dr. William Cisse at [facility].

## 2020-11-02 NOTE — H&P
Date of Surgery Update:  Jarett Lowery was seen, history and physical examination reviewed, and patient examined by me today. There have been no significant clinical changes since the completion of the previous history and physical.    The risk, benefits, and alternatives of the proposed procedure have been explained to the patient (or appropriate guardian) and understanding verbalized. All questions answered. Patient wishes to proceed.     Electronically signed by: Nitin Braun MD,11/2/2020,6:59 AM

## 2020-11-02 NOTE — PROGRESS NOTES
[id number] [facility] Orthopedic Surgery   Progress Note    CHIEF COMPLAINT/DIAGNOSIS:  11/2/20 RIGHT TKA    SUBJECTIVE: Patient sitting up in bed eating lunch; describes no knee pain as reports, 'I'm still numb\". Wants home PT. Has walker/cane at home. OBJECTIVE  Physical    VITALS:  BP (!) 141/74   Pulse 69   Temp 97 °F (36.1 °C) (Temporal)   Resp 18   Ht 5' (1.524 m)   Wt 198 lb (89.8 kg)   SpO2 100%   Breastfeeding No   BMI 38.67 kg/m²     GENERAL: Alert, NAD  MUSCULOSKELETAL: RIGHT LE  INCISION:  Ace dressing c/d/i. Cooling pad in place  ROM: intact DF/PF  Sensory:  Intact to light touch in peroneal distribution; insensate in tibial distribution likely 2/2 block  Vascular:   Intact DP pulse;  calf soft and nontender    Data    ALL MEDICATIONS HAVE BEEN REVIEWED    CBC:   Recent Labs     11/02/20  1138   WBC 8.7   HGB 13.8   HCT 40.7        BMP:   Recent Labs     11/02/20  1138      K 4.7      CO2 27   BUN 11   CREATININE <0.5*     INR: No results for input(s): INR in the last 72 hours. ASSESSMENT:  11/2/20 RIGHT TKA, POD#0  Obesity (BMI 39)  Thyroid disorder    PLAN:   - WB status:  WBAT   - DVT prophylaxis: ASA BID  - PT/OT  - D/C Plan: await PT recs; likely home tomorrow with home P T  - Pain Control: current regimen. Due to orthopaedic surgical procedure/condition, patient may require pain medication for up to 6-8 weeks. - F U with Dr. Rani Bell in 2 weeks.       SOFIYA ANN-CNP  11/2/2020  2:24 PM    .

## 2020-11-02 NOTE — PROGRESS NOTES
Occupational Therapy   Occupational Therapy Initial Assessment  Date: 2020   Patient Name: Agnes Chin  MRN: [id number]     : 1955    Date of Service: 2020    Discharge Recommendations: Agnes Chin scored a 20/24 on the AM-PAC ADL Inpatient form. Current research shows that an AM-PAC score of 18 or greater is typically associated with a discharge to the patient's home setting. Based on the patient's AM-PAC score, and their current ADL deficits, it is recommended that the patient have 2-3 sessions per week of Occupational Therapy at d/c to increase the patient's independence. At this time, this patient demonstrates the endurance and safety to discharge home with home services (home vs OP services) and a follow up treatment frequency of 2-3x/wk. Please see assessment section for further patient specific details. If patient discharges prior to next session this note will serve as a discharge summary. Please see below for the latest assessment towards goals. HOME HEALTH CARE: LEVEL 1 STANDARD    - Initial home health evaluation to occur within 24-48 hours, in patient home   - Therapy to evaluate with goal of regaining prior level of functioning   - Therapy to evaluate if patient has [address] needs for personal care       OT Equipment Recommendations  Equipment Needed: No    Assessment   Performance deficits / Impairments: Decreased functional mobility ; Decreased endurance;Decreased ADL status; Decreased high-level IADLs;Decreased cognition;Decreased balance  Assessment: Patient presents below baseline d/t above deficits, OT indicated to maximize safety/independence with ADL and IADL  Treatment Diagnosis: Above deficits associated with R TKA  Prognosis: Good  Decision Making: Low Complexity  Exam: as above  OT Education: OT Role;Plan of Care  Patient Education: eval, discharge - patient v/u  REQUIRES OT FOLLOW UP: Yes  Activity Tolerance  Activity Tolerance: Patient Tolerated treatment well;Patient limited by fatigue  Safety Devices  Safety Devices in place: Yes  Type of devices: All fall risk precautions in place; Left in bed;Nurse notified; Chair alarm in place(in PACU bed)           Patient Diagnosis(es): The encounter diagnosis was Preop testing. has a past medical history of Kidney stone, Pneumonia, and Thyroid disease. has a past surgical history that includes eye muscle surgery; Lithotripsy; and Tubal ligation (1995). Treatment Diagnosis: Above deficits associated with R TKA      Restrictions  Restrictions/Precautions  Restrictions/Precautions: Weight Bearing, Fall Risk(HIGH FALL RISK)  Required Braces or Orthoses?: No  Lower Extremity Weight Bearing Restrictions  Right Lower Extremity Weight Bearing: Weight Bearing As Tolerated  Position Activity Restriction  Other position/activity restrictions: S/P R TKA 11/2/2020. Subjective   General  Chart Reviewed: Yes  Diagnosis: s/p R TKA  Subjective  Subjective: Patient lying in PACU stretcher upon arrival, agreeable to evaluation  Patient Currently in Pain: Denies  Pain Assessment  Pain Level: 3  Vital Signs  Patient Currently in Pain: Denies  Social/Functional History  Social/Functional History  Lives With: Daughter(pt will have dtr staying upon discharge)  Type of Home: [address] [place] [address],[address]: One level(1 floor with basement)  Home Access: Stairs to enter with rails  Entrance Stairs - Number of Steps: 1 PANCHO garage, 1 PANCHO front with B railing  Entrance Stairs - Rails: Both  Bathroom Shower/Tub: Tub/Shower unit  Bathroom Toilet: Standard(has riser/commode to increase height)  Home Equipment: Rolling walker, Cane  ADL Assistance: Independent  Homemaking Assistance: Independent  Ambulation Assistance: Independent(cane)  Transfer Assistance: Independent  Active : Yes  Additional Comments: Pt reports no recent falls.        Objective   Vision: Impaired  Vision Exceptions: Wears glasses at all times  Hearing: Within functional limits    Orientation  Overall Orientation Status: Within Functional Limits     Balance  Sitting Balance: Stand by assistance  Standing Balance: Contact guard assistance  Functional Mobility  Functional - Mobility Device: Rolling Walker  Activity: Other  Assist Level: Contact guard assistance  Functional Mobility Comments: 48' x2, from PACU bay <> bathroom  Toilet Transfers  Toilet - Technique: Ambulating  Equipment Used: Standard toilet  Toilet Transfer: Contact guard assistance  ADL  LE Dressing:  Moderate assistance(donning depends)  Toileting: Contact guard assistance  Tone RUE  RUE Tone: Normotonic  Tone LUE  LUE Tone: Normotonic  Coordination  Movements Are Fluid And Coordinated: Yes     Bed mobility  Supine to Sit: Stand by assistance  Sit to Supine: Stand by assistance  Scooting: Stand by assistance  Transfers  Sit to stand: Contact guard assistance  Stand to sit: Contact guard assistance  Vision - Basic Assessment  Vision Comments: Pt reports hx of difficulty with depth perception, not observed during evaluation  Cognition  Overall Cognitive Status: WFL        Sensation  Overall Sensation Status: Impaired(decreased sensation to BLE due to spinal block)                               Plan   Plan  Times per week: 7  Times per day: Daily  Current Treatment Recommendations: Strengthening, Patient/Caregiver Education & Training, Equipment Evaluation, Education, & procurement, Balance Training, Functional Mobility Training, Endurance Training, Self-Care / ADL, Safety Education & Training    G-Code     OutComes Score                                                  AM-PAC Score        AM-PAC Inpatient Daily Activity Raw Score: 20 (11/02/20 1757)  AM-PAC Inpatient ADL T-Scale Score : 42.03 (11/02/20 1757)  ADL Inpatient CMS 0-100% Score: 38.32 (11/02/20 1757)  ADL Inpatient CMS G-Code Modifier : Parker Saravia (11/02/20 1757)    Goals  Short term goals  Time Frame for Short term goals: discharge  Short term goal 1: UB ADL mod I  Short term goal 2: LB ADL mod I  Short term goal 3: Fxl transfers mod I  Short term goal 4: Fxl mob mod I  Short term goal 5:  Toileting mod I  Long term goals  Time Frame for Long term goals : LTG=STG       Therapy Time   Individual Concurrent Group Co-treatment   Time In 1653         Time Out 1731         Minutes 38            Timed Code Treatment Minutes:   23 minutes    Total Treatment Minutes:  38 minutes    Billing split with PT secondary to patient's observation status    Krista Basilio, [address] [facility] OTR/L [id number]    Krista Basilio, OT

## 2020-11-02 NOTE — BRIEF OP NOTE
Brief Postoperative Note      Patient: Mal Stevens  YOB: 1955  MRN: [id number]    Date of Procedure: 11/2/2020    Pre-Op Diagnosis: M17.11  OSTEOARTHRITIS KNEE    Post-Op Diagnosis: Same       Procedure(s):  RIGHT TOTAL KNEE ARTHROPLASTY - DEPUY [facility]    Surgeon(s):  Anahi Love MD    Assistant:  Surgical Assistant: Gagan Charlton RN  First Assistant: Jaimie Mcdonald    Anesthesia: General    Estimated Blood Loss (mL): 601     Complications: None    Specimens:   * No specimens in log *    Implants:  Implant Name Type Inv.  Item Serial No.  Lot No. LRB No. Used Action   CEMENT BNE 40GM FULL DOSE PMMA W/ GENT HI VISC RADPQ LNG  CEMENT BNE 40GM FULL DOSE PMMA W/ GENT HI VISC RADPQ LNG  [facility] [id number] Right 2 Implanted   COMPONENT PAT [id number] KNEE POLY LAZARO MEDIALIZED KURT ATTUNE  COMPONENT PAT [id number] KNEE POLY LAZARO MEDIALIZED KURT ATTUNE  [facility] [id number] Right 1 Implanted   BASEPLATE TIB SZ 4 KNEE ROT PLATFRM LAZARO SYS ATTUNE  BASEPLATE TIB SZ 4 KNEE ROT PLATFRM LAZARO SYS ATTUNE  [facility] [id number] Right 1 Implanted   COMPONENT FEM SZ 4 R KNEE BRAYAN POST STBL LAZARO ATTUNE  COMPONENT FEM SZ 4 R KNEE BRAYAN POST STBL LAZARO ATTUNE  [facility] [id number] Right 1 Implanted   INSERT TIB SZ 4 THK5MM KNEE POST STBL ROT PLATFRM ATTUNE  INSERT TIB SZ 4 THK5MM KNEE POST STBL ROT PLATFRM ATTUNE  [facility] [id number] Right 1 Implanted         Drains: * No LDAs found *      Electronically signed by Anahi Love MD on 11/2/2020 at 10:15 AM

## 2020-11-02 NOTE — ANESTHESIA PRE PROCEDURE
mg  6.25 mg Intravenous PRN Mathews Landau, MD        labetalol (NORMODYNE;TRANDATE) injection 5 mg  5 mg Intravenous Q10 Min PRN Mathews Landau, MD        meperidine (DEMEROL) injection 12.5 mg  12.5 mg Intravenous Q5 Min PRN Mathews Landau, MD           Allergies:     Allergies   Allergen Reactions    Penicillins Rash       Problem List:    Patient Active Problem List   Diagnosis Code    Ureteral calculi--calcium oxalate; left ureteral N20.1    Adult BMI 30+ [id number]    Hypothyroidism E03.9    10 year risk of MI or stroke < 7.5% Z91.89    Alkaline phosphatase raised R74.8    Vitamin D deficiency E55.9    Abnormal GGT test R74.8    Primary osteoarthritis of right knee M17.11    Sensorineural hearing loss, bilateral H90.3       Past Medical History:        Diagnosis Date    Kidney stone     Pneumonia     Thyroid disease        Past Surgical History:        Procedure Laterality Date    EYE MUSCLE SURGERY      age 14     [address]OTRIPSY      2012, 2013    [address]       Social History:    Social History     Tobacco Use    Smoking status: Former Smoker    Smokeless tobacco: Never Used   Substance Use Topics    Alcohol use: Not Currently                                Counseling given: Not Answered      Vital Signs (Current):   Vitals:    10/06/20 1348 11/02/20 0616 11/02/20 0636   BP:   (!) 154/75   Pulse:   74   Resp:   16   Temp:   98.8 °F (37.1 °C)   TempSrc:   Temporal   SpO2:   99%   Weight: 194 lb (88 kg) 198 lb (89.8 kg)    Height: 5' (1.524 m) 5' (1.524 m)                                               BP Readings from Last 3 Encounters:   11/02/20 (!) 154/75   10/19/20 (!) 144/82   01/27/20 132/80       NPO Status: Time of last liquid consumption: 2000                        Time of last solid consumption: 2000                        Date of last liquid consumption: 11/01/20                        Date of last solid food consumption: 11/01/20    BMI:   Wt Readings from Last 3 Encounters:   11/02/20 198 lb (89.8 kg)   10/19/20 199 lb 12.8 oz (90.6 kg)   05/29/20 186 lb 1.1 oz (84.4 kg)     Body mass index is 38.67 kg/m². CBC:   Lab Results   Component Value Date    WBC 9.2 10/12/2020    RBC 4.74 10/12/2020    HGB 14.5 10/12/2020    HCT 43.9 10/12/2020    MCV 92.6 10/12/2020    RDW 13.5 10/12/2020     10/12/2020       CMP:   Lab Results   Component Value Date     10/12/2020    K 4.6 10/12/2020     10/12/2020    CO2 27 10/12/2020    BUN 20 10/12/2020    CREATININE <0.5 10/12/2020    GFRAA >60 10/12/2020    AGRATIO 2.0 09/30/2020    LABGLOM >60 10/12/2020    GLUCOSE 85 10/12/2020    PROT 6.7 09/30/2020    CALCIUM 10.0 10/12/2020    BILITOT 0.5 09/30/2020    ALKPHOS 127 09/30/2020    AST 21 09/30/2020    ALT 24 09/30/2020       POC Tests: No results for input(s): POCGLU, POCNA, POCK, POCCL, POCBUN, POCHEMO, POCHCT in the last 72 hours.     Coags:   Lab Results   Component Value Date    PROTIME 10.8 10/12/2020    INR 0.93 10/12/2020    APTT 31.5 10/12/2020       HCG (If Applicable): No results found for: PREGTESTUR, PREGSERUM, HCG, HCGQUANT     ABGs: No results found for: PHART, PO2ART, VUS7WLV, ATZ3AHW, BEART, T0ZQCSHR     Type & Screen (If Applicable):  No results found for: LABABO, LABRH    Drug/Infectious Status (If Applicable):  No results found for: HIV, HEPCAB    COVID-19 Screening (If Applicable):   Lab Results   Component Value Date    COVID19 Not Detected 10/27/2020         Anesthesia Evaluation    Airway: Mallampati: II  TM distance: >3 FB   Neck ROM: full  Mouth opening: > = 3 FB Dental:          Pulmonary:                              Cardiovascular:            Rhythm: regular  Rate: normal                    Neuro/Psych:               GI/Hepatic/Renal:             Endo/Other:    (+) hypothyroidism::., .                 Abdominal:           Vascular:                                        Anesthesia Plan      MAC, regional and general     ASA 2

## 2020-11-02 NOTE — ANESTHESIA POSTPROCEDURE EVALUATION
Department of Anesthesiology  Postprocedure Note    Patient: Jimmy Hanson  MRN: [id number]  YOB: 1955  Date of evaluation: 11/2/2020  Time:  10:41 AM     Procedure Summary     Date:  11/02/20 Room / Location:  [address]    Anesthesia Start:  0830 Anesthesia Stop:  4572    Procedure:  RIGHT TOTAL KNEE ARTHROPLASTY - DEPUY ADVANCED (Right Knee) Diagnosis:  (M17.11  OSTEOARTHRITIS KNEE)    Surgeon:  Mikael Busby MD Responsible Provider:  Asif Montano MD    Anesthesia Type:  MAC, general, regional ASA Status:  2          Anesthesia Type: MAC, general, regional    Nell Phase I:      Nell Phase II:      Last vitals: Reviewed and per EMR flowsheets.        Anesthesia Post Evaluation    Level of consciousness: awake  Complications: no

## 2020-11-02 NOTE — DISCHARGE INSTR - COC
Tetanus Toxoid, Pf (Tenivac, Decavac) 06/21/2005    Tdap (Boostrix, Adacel) 11/16/2010, 06/06/2015       Active Problems:  Patient Active Problem List   Diagnosis Code    Ureteral calculi--calcium oxalate; left ureteral N20.1    Adult BMI 30+ [id number]    Hypothyroidism E03.9    10 year risk of MI or stroke < 7.5% Z91.89    Alkaline phosphatase raised R74.8    Vitamin D deficiency E55.9    Abnormal GGT test R74.8    Primary osteoarthritis of right knee M17.11    Sensorineural hearing loss, bilateral H90.3       Isolation/Infection:   Isolation            No Isolation          Patient Infection Status       None to display            Nurse Assessment:  Last Vital Signs: BP (!) 154/75   Pulse 74   Temp 98.8 °F (37.1 °C) (Temporal)   Resp 16   Ht 5' (1.524 m)   Wt 198 lb (89.8 kg)   SpO2 99%   Breastfeeding No   BMI 38.67 kg/m²     Last documented pain score (0-10 scale):    Last Weight:   Wt Readings from Last 1 Encounters:   11/02/20 198 lb (89.8 kg)     Mental Status:  oriented, alert, coherent, logical, thought processes intact and able to concentrate and follow conversation    IV Access:  - None    Nursing Mobility/ADLs:  Walking   Assisted  Transfer  Assisted  Bathing  Assisted  Dressing  Assisted  Toileting  Assisted  Feeding  [address]  Independent  Med Delivery   whole    Wound Care Documentation and Therapy:        Elimination:  Continence:   · Bowel: Yes  · Bladder: Yes  Urinary Catheter: None   Colostomy/Ileostomy/Ileal Conduit: No        Date of Last BM: ***    Intake/Output Summary (Last 24 hours) at 11/2/2020 1043  Last data filed at 11/2/2020 1025  Gross per 24 hour   Intake 1000 ml   Output 30 ml   Net 970 ml     No intake/output data recorded. Safety Concerns:      At Risk for Falls    Impairments/Disabilities:      Vision    Nutrition Therapy:  Current Nutrition Therapy:   - Oral Diet:  General    Routes of Feeding: Oral  Liquids: No Restrictions  Daily Fluid Restriction: no  Last Modified Barium Swallow with Video (Video Swallowing Test): not done    Treatments at the Time of Hospital Discharge:   Respiratory Treatments: incentive spirometer -10x every hour while awake for 5 days post-op  Oxygen Therapy:  is not on home oxygen therapy. Ventilator:    - No ventilator support    Rehab Therapies: Physical Therapy PT  Weight Bearing Status/Restrictions: Weight bear as tolerate  Other Medical Equipment (for information only, NOT a DME order):  walker  Other Treatments: Wound Care:   -  Remove overdressing in 1 week. -  Maintain Prineo dressing (glued clear dressing over incision);  keep in place until your post op visit with Dr. Mary Beth Curiel .  -  Keep incision clean at all times. Keep pets away from your incision. May shower; no baths. -  No driving for at least 2 weeks; no driving while on narcotic pain medication.  -  Prevent constipation while taking narcotics by drinking plenty of water, using laxative or stool softeners as needed. -  Continue use of cooling pad/ice pack as needed for pain. -  Wear compression stockings during the day until your post op office visit. DVT prophylaxis:   mg. Twice daily x 14 days to begin day after DC from acute hospital.    Follow-Up with Dr. Bruno Miller:  Call to schedule two-week follow up appointment:  ([phone])      May discard excess narcotic medications at the following facilities:    [facility]. [facility] [employer]. [address] [place] [address]. [address]     Patient's personal belongings (please select all that are sent with patient):  Glasses    RN SIGNATURE:  Electronically signed by Sierra Garcia RN on 11/3/20 at 10:45 AM EST    CASE MANAGEMENT/SOCIAL WORK SECTION    Inpatient Status Date: 11/2/2020    Readmission Risk Assessment Score:  Readmission Risk              Risk of Unplanned Readmission:        0           Discharging to Facility/ Agency   · Name: [facility]  Phone: [phone]         Fax: [phone]        / signature: Electronically signed by JUANCARLOS Whitley LSW on 11/3/20 at 7:24 AM EST    PHYSICIAN SECTION    Prognosis: Good    Condition at Discharge: Stable    Rehab Potential (if transferring to Rehab): Good    Recommended Labs or Other Treatments After Discharge: Home PT    Physician Certification: I certify the above information and transfer of Yvonne Bettencourt  is necessary for the continuing treatment of the diagnosis listed and that she requires Home Care for less 30 days.      Update Admission H&P: Changes in H&P as follows - s/p TKA    PHYSICIAN SIGNATURE:  Electronically signed by SOFIYA Buckley CNP on 11/3/20 at 10:39 AM EST

## 2020-11-02 NOTE — PROGRESS NOTES
Xray obtained and CBC drawn by lab. Results pending. Spinal wearing off, pt able to bend knees, move ankles and has feeling in great toe. Pt had block and denies pain at this time. VSS. Incontinent of urine and steph care provided.

## 2020-11-02 NOTE — PROGRESS NOTES
Incentive spirometry initiated at bedside. Still awaiting bed availability. VSS. Oxycodone 5 mg effective for pain relief. Fluids at 125ml/hr. Pt ate full lunch. No nausea. Neurovascular checks WNL to RLE. Polar ice in place. Arnold and MARKUS to non operative leg.

## 2020-11-02 NOTE — PROGRESS NOTES
Physical Therapy    Facility/Department: [facility] OR  Initial Assessment    NAME: Agnes Chin  : 1955  MRN: [id number]    Date of Service: 2020    Discharge Recommendations: Agnes Chin scored a 18/24 on the AM-PAC short mobility form. Current research shows that an AM-PAC score of 18 or greater is typically associated with a discharge to the patient's home setting. Based on the patient's AM-PAC score and their current functional mobility deficits, it is recommended that the patient have 2-3 sessions per week of Physical Therapy at d/c to increase the patient's independence. At this time, this patient demonstrates the endurance and safety to discharge home with HHPT and a follow up treatment frequency of 2-3x/wk. Please see assessment section for further patient specific details. HOME HEALTH CARE: LEVEL 3 SAFETY   - Initial home health evaluation to occur within 24-48 hours, in patient home   - Therapy evaluations in home within 24-48 hours of discharge; including DME and home safety   - Frontload therapy 5 days, then 3x a week   - Therapy to evaluate if patient has [address] needs for personal care   -  evaluation within 24-48 hours, includes evaluation of resources and insurance to determine [place], [place], LTC, and Medicaid options     If patient discharges prior to next session this note will serve as a discharge summary. Please see below for the latest assessment towards goals. PT Equipment Recommendations  Equipment Needed: No(has RW)    Assessment   Body structures, Functions, Activity limitations: Decreased functional mobility ; Decreased ROM; Decreased strength;Decreased endurance;Decreased balance  Assessment: Pt presents with the above deficits s/p R TKA impairing her ability to perform functional mobility safely and independently. Pt with PLOF of independence and currently requires CGA with use of RW for mobility.  Pt would benefit from acute PT services to address deficits and faciltiate return to PLOF. Treatment Diagnosis: impaired gait, transfers, and balance  Prognosis: Good  Decision Making: Low Complexity  Clinical Presentation: stable  PT Education: Goals;PT Role;Plan of Care;Home Exercise Program;Precautions;Transfer Training;Weight-bearing Education;Equipment; Family Education;Orientation;General Safety;Gait Training;Functional Mobility Training  Patient Education: d/c recommendations-pt verbalizing understanding; HEP reviewed and pt able to recal 2 exercises-will require reinforcement  Barriers to Learning: none  REQUIRES PT FOLLOW UP: Yes  Activity Tolerance  Activity Tolerance: Patient limited by endurance; Patient Tolerated treatment well       Patient Diagnosis(es): The encounter diagnosis was Preop testing. has a past medical history of Kidney stone, Pneumonia, and Thyroid disease. has a past surgical history that includes eye muscle surgery; Lithotripsy; and Tubal ligation (1995). Restrictions  Restrictions/Precautions  Restrictions/Precautions: Weight Bearing, Fall Risk(HIGH FALL RISK)  Required Braces or Orthoses?: No  Lower Extremity Weight Bearing Restrictions  Right Lower Extremity Weight Bearing: Weight Bearing As Tolerated  Position Activity Restriction  Other position/activity restrictions: S/P R TKA 11/2/2020. Vision/Hearing  Vision: Impaired  Vision Exceptions: Wears glasses at all times  Hearing: Within functional limits       Subjective  General  Chart Reviewed: Yes  Response To Previous Treatment: Not applicable  Family / Caregiver Present: Yes(daughter present throughout session)  Diagnosis: R TKA  Follows Commands: Within Functional Limits  General Comment  Comments: Pt supine in bed upon arrival.  Subjective  Subjective: Pt agreeable to PT/OT eval. Pt reporting no pain at this time.   Pain Screening  Patient Currently in Pain: Denies  Vital Signs  Patient Currently in Pain: Denies       Orientation  Orientation  Overall LOB.  Stairs/Curb  Stairs?: No     Balance  Posture: Good  Sitting - Static: Good;-(SBA seated at toilet for toileting ~5 minutes total)  Sitting - Dynamic: Good;-(SBA seated at toilet while donning briefs)  Standing - Static: Fair;+(CGA standing with RW for UE support)  Standing - Dynamic: Fair;+(CGA for transfers and ambulation with RW)        Plan   Plan  Times per week: 7x (BID)  Times per day: Twice a day  Current Treatment Recommendations: Strengthening, ROM, Balance Training, Functional Mobility Training, Transfer Training, Stair training, Gait Training, Endurance Training, Neuromuscular Re-education, Positioning, Modalities, Equipment Evaluation, Education, & procurement, Patient/Caregiver Education & Training, Safety Education & Training, Home Exercise Program  Safety Devices  Type of devices:  All fall risk precautions in place, Gait belt, Patient at risk for falls, Left in bed, Nurse notified(pt left supine in PACU bed, PACU RN aware)  Restraints  Initially in place: No    G-Code       OutComes Score           AM-PAC Score  AM-PAC Inpatient Mobility Raw Score : 18 (11/02/20 1748)  AM-PAC Inpatient T-Scale Score : 43.63 (11/02/20 1748)  Mobility Inpatient CMS 0-100% Score: 46.58 (11/02/20 1748)  Mobility Inpatient CMS G-Code Modifier : CK (11/02/20 1748)          Goals  Short term goals  Time Frame for Short term goals: upon d/c  Short term goal 1: Pt will perform bed mobility MOD I  Short term goal 2: Pt will perform transfers with RW MOD I  Short term goal 3: Pt will ambulate 150' with RW MOD I  Short term goal 4: Pt will negotiate curb step with LRAD and SBA  Short term goal 5: Pt will perform car transfer with RW and SBA  Short term goal 6: Pt will achieve 90 degrees AROM of R knee flexion  Patient Goals   Patient goals : pt did not state       Therapy Time   Individual Concurrent Group Co-treatment   Time In 1653         Time Out 1731         Minutes 38              Timed Code Treatment Minutes:

## 2020-11-03 VITALS
SYSTOLIC BLOOD PRESSURE: 118 MMHG | HEART RATE: 65 BPM | RESPIRATION RATE: 16 BRPM | WEIGHT: 198 LBS | BODY MASS INDEX: 38.87 KG/M2 | TEMPERATURE: 97.8 F | DIASTOLIC BLOOD PRESSURE: 69 MMHG | OXYGEN SATURATION: 96 % | HEIGHT: 60 IN

## 2020-11-03 LAB
ANION GAP SERPL CALCULATED.3IONS-SCNC: 9 MMOL/L (ref 3–16)
BASOPHILS ABSOLUTE: 0 K/UL (ref 0–0.2)
BASOPHILS RELATIVE PERCENT: 0 %
BUN BLDV-MCNC: 20 MG/DL (ref 7–20)
CALCIUM SERPL-MCNC: 8.9 MG/DL (ref 8.3–10.6)
CHLORIDE BLD-SCNC: 105 MMOL/L (ref 99–110)
CO2: 25 MMOL/L (ref 21–32)
CREAT SERPL-MCNC: 0.6 MG/DL (ref 0.6–1.2)
EOSINOPHILS ABSOLUTE: 0 K/UL (ref 0–0.6)
EOSINOPHILS RELATIVE PERCENT: 0 %
GFR AFRICAN AMERICAN: >60
GFR NON-AFRICAN AMERICAN: >60
GLUCOSE BLD-MCNC: 128 MG/DL (ref 70–99)
GLUCOSE BLD-MCNC: 135 MG/DL (ref 70–99)
GLUCOSE BLD-MCNC: 236 MG/DL (ref 70–99)
HCT VFR BLD CALC: 36.9 % (ref 36–48)
HEMOGLOBIN: 12.3 G/DL (ref 12–16)
LYMPHOCYTES ABSOLUTE: 1.3 K/UL (ref 1–5.1)
LYMPHOCYTES RELATIVE PERCENT: 8.5 %
MCH RBC QN AUTO: 30.8 PG (ref 26–34)
MCHC RBC AUTO-ENTMCNC: 33.2 G/DL (ref 31–36)
MCV RBC AUTO: 92.6 FL (ref 80–100)
MONOCYTES ABSOLUTE: 0.8 K/UL (ref 0–1.3)
MONOCYTES RELATIVE PERCENT: 5.3 %
NEUTROPHILS ABSOLUTE: 12.9 K/UL (ref 1.7–7.7)
NEUTROPHILS RELATIVE PERCENT: 86.2 %
PDW BLD-RTO: 13.1 % (ref 12.4–15.4)
PERFORMED ON: ABNORMAL
PERFORMED ON: ABNORMAL
PLATELET # BLD: 256 K/UL (ref 135–450)
PMV BLD AUTO: 9 FL (ref 5–10.5)
POTASSIUM SERPL-SCNC: 4 MMOL/L (ref 3.5–5.1)
RBC # BLD: 3.99 M/UL (ref 4–5.2)
SODIUM BLD-SCNC: 139 MMOL/L (ref 136–145)
WBC # BLD: 15 K/UL (ref 4–11)

## 2020-11-03 PROCEDURE — 6360000002 HC RX W HCPCS: Performed by: ORTHOPAEDIC SURGERY

## 2020-11-03 PROCEDURE — 97535 SELF CARE MNGMENT TRAINING: CPT

## 2020-11-03 PROCEDURE — 6370000000 HC RX 637 (ALT 250 FOR IP): Performed by: INTERNAL MEDICINE

## 2020-11-03 PROCEDURE — APPNB30 APP NON BILLABLE TIME 0-30 MINS: Performed by: NURSE PRACTITIONER

## 2020-11-03 PROCEDURE — 85025 COMPLETE CBC W/AUTO DIFF WBC: CPT

## 2020-11-03 PROCEDURE — 97530 THERAPEUTIC ACTIVITIES: CPT

## 2020-11-03 PROCEDURE — 99024 POSTOP FOLLOW-UP VISIT: CPT | Performed by: NURSE PRACTITIONER

## 2020-11-03 PROCEDURE — 80048 BASIC METABOLIC PNL TOTAL CA: CPT

## 2020-11-03 PROCEDURE — G0378 HOSPITAL OBSERVATION PER HR: HCPCS

## 2020-11-03 PROCEDURE — 6370000000 HC RX 637 (ALT 250 FOR IP): Performed by: ORTHOPAEDIC SURGERY

## 2020-11-03 PROCEDURE — 97116 GAIT TRAINING THERAPY: CPT

## 2020-11-03 PROCEDURE — 36415 COLL VENOUS BLD VENIPUNCTURE: CPT

## 2020-11-03 RX ORDER — ACETAMINOPHEN 325 MG/1
650 TABLET ORAL EVERY 6 HOURS
Qty: 168 TABLET | Refills: 0 | Status: SHIPPED | OUTPATIENT
Start: 2020-11-03 | End: 2021-05-06

## 2020-11-03 RX ORDER — OXYCODONE HYDROCHLORIDE 5 MG/1
5-10 TABLET ORAL EVERY 4 HOURS PRN
Qty: 42 TABLET | Refills: 0 | Status: SHIPPED | OUTPATIENT
Start: 2020-11-03 | End: 2020-11-10

## 2020-11-03 RX ORDER — CELECOXIB 200 MG/1
200 CAPSULE ORAL DAILY
Qty: 28 CAPSULE | Refills: 0 | Status: SHIPPED | OUTPATIENT
Start: 2020-11-04 | End: 2021-05-06

## 2020-11-03 RX ADMIN — DEXAMETHASONE SODIUM PHOSPHATE 8 MG: 4 INJECTION, SOLUTION INTRAMUSCULAR; INTRAVENOUS at 05:13

## 2020-11-03 RX ADMIN — ASPIRIN 325 MG: 325 TABLET, COATED ORAL at 08:00

## 2020-11-03 RX ADMIN — CELECOXIB 200 MG: 200 CAPSULE ORAL at 08:00

## 2020-11-03 RX ADMIN — ACETAMINOPHEN 650 MG: 325 TABLET ORAL at 08:00

## 2020-11-03 RX ADMIN — STANDARDIZED SENNA CONCENTRATE AND DOCUSATE SODIUM 1 TABLET: 8.6; 5 TABLET ORAL at 08:00

## 2020-11-03 RX ADMIN — INSULIN LISPRO 6 UNITS: 100 INJECTION, SOLUTION INTRAVENOUS; SUBCUTANEOUS at 11:44

## 2020-11-03 RX ADMIN — LEVOTHYROXINE SODIUM 88 MCG: 0.09 TABLET ORAL at 05:13

## 2020-11-03 ASSESSMENT — PAIN DESCRIPTION - PROGRESSION
CLINICAL_PROGRESSION: NOT CHANGED

## 2020-11-03 ASSESSMENT — PAIN SCALES - GENERAL
PAINLEVEL_OUTOF10: 0
PAINLEVEL_OUTOF10: 0

## 2020-11-03 NOTE — PROGRESS NOTES
3355: Shift assessment complete. VSS. Alert and oriented x4. Neuro checks WNL. See flowsheets. Morning medications given per MAR. The care plan and education has been reviewed and mutually agreed upon with the patient. Pt needs expressed, call light within reach, will continue to monitor.

## 2020-11-03 NOTE — PROGRESS NOTES
Occupational Therapy  Facility/Department: [address] ORTHO/NEURO NURSING  Daily Treatment Note  NAME: Jamie Noe  : 1955  MRN: [id number]    Date of Service: 11/3/2020    Discharge Recommendations: Jamie Noe scored a 19/24 on the AM-PAC ADL Inpatient form. Current research shows that an AM-PAC score of 18 or greater is typically associated with a discharge to the patient's home setting. Based on the patient's AM-PAC score, and their current ADL deficits, it is recommended that the patient have 2-3 sessions per week of Occupational Therapy at d/c to increase the patient's independence. At this time, this patient demonstrates the endurance and safety to discharge home with [facility] (home vs OP services) and a follow up treatment frequency of 2-3x/wk. Please see assessment section for further patient specific details. If patient discharges prior to next session this note will serve as a discharge summary. Please see below for the latest assessment towards goals. HOME HEALTH CARE: LEVEL 1 STANDARD    - Initial home health evaluation to occur within 24-48 hours, in patient home   - Therapy to evaluate with goal of regaining prior level of functioning   - Therapy to evaluate if patient has [address] needs for personal care       OT Equipment Recommendations  Equipment Needed: No    Assessment   Performance deficits / Impairments: Decreased functional mobility ; Decreased endurance;Decreased ADL status; Decreased high-level IADLs;Decreased cognition;Decreased balance  Assessment: Patient presents below baseline d/t above deficits, OT indicated to maximize safety/independence with ADL and IADL  Treatment Diagnosis: Above deficits associated with R TKA  Prognosis: Good  Decision Making: Low Complexity  Exam: as above  OT Education: OT Role;Plan of Care;Transfer Training  Patient Education: Pt is an independent learner  REQUIRES OT FOLLOW UP: Yes  Activity Tolerance  Activity Tolerance: Transfers  Toilet - Technique: Ambulating  Equipment Used: Standard toilet  Toilet Transfer: Minimal assistance  Toilet Transfers Comments: Due to low toilet (has raised toilet at home)  Tub Transfers  Tub Transfers Comments: Pt reports having trouble transferring to/from tub; expresses she will sponge bathe and not attempt tub transfers at this time. Bed mobility  Comment: Pt seated in chair at start and end of session. Transfers  Sit to stand: Minimal assistance(Min(a) from standard toilet, CGA X3 from chair)  Stand to sit: Contact guard assistance      Cognition  Overall Cognitive Status: WFL     Perception  Overall Perceptual Status: [facility]         Plan   Plan  Times per week: 7  Times per day: Daily  Current Treatment Recommendations: Strengthening, Patient/Caregiver Education & Training, Equipment Evaluation, Education, & procurement, Balance Training, Functional Mobility Training, Endurance Training, Self-Care / ADL, Safety Education & Training    AM-PAC Score  AM-PAC Inpatient Daily Activity Raw Score: 19 (11/03/20 1159)  AM-PAC Inpatient ADL T-Scale Score : 40.22 (11/03/20 1159)  ADL Inpatient CMS 0-100% Score: 42.8 (11/03/20 1159)  ADL Inpatient CMS G-Code Modifier : CK (11/03/20 1159)    Goals  Short term goals  Time Frame for Short term goals: discharge  Short term goal 1: UB ADL mod I-- SBA 11/2  Short term goal 2: LB ADL mod I-- Max(a) 11/2  Short term goal 3: Fxl transfers mod I-- CGA  from chair, Min(a) from toilet 11/2  Short term goal 4: Fxl mob mod I-- CGA progressing to SBA 11/2  Short term goal 5:  Toileting mod I-- Min(a) 11/2  Long term goals  Time Frame for Long term goals : LTG=STG       Therapy Time   Individual Concurrent Group Co-treatment   Time In 1104         Time Out 1142         Minutes 38           Timed Code Treatment Minutes:  38 minutes    Total Treatment Minutes:  38 minutes     LEIGHA Vasquez    OT provided direct supervision to student, facilitated in making skilled judgements throughout duration of session.     Darden Kawasaki, MOT OTR/L [id number]     Darden Kawasaki, OT

## 2020-11-03 NOTE — PLAN OF CARE
Problem: Discharge Planning:  Goal: Discharged to appropriate level of care  Description: Discharged to appropriate level of care  11/3/2020 1107 by Michelle Bello RN  Outcome: Ongoing  11/3/2020 0354 by Devin He RN  Outcome: Ongoing     Problem: Mobility - Impaired:  Goal: Mobility will improve  Description: Mobility will improve  11/3/2020 1107 by Michelle Belol RN  Outcome: Ongoing  11/3/2020 0354 by Devin He RN  Outcome: Ongoing     Problem: Infection - Surgical Site:  Goal: Will show no infection signs and symptoms  Description: Will show no infection signs and symptoms  11/3/2020 1107 by Michelle Bello RN  Outcome: Ongoing  11/3/2020 0354 by Devin He RN  Outcome: Ongoing     Problem: Pain - Acute:  Goal: Pain level will decrease  Description: Pain level will decrease  11/3/2020 1107 by Michelle Bello RN  Outcome: Ongoing  11/3/2020 0354 by Devin He RN  Outcome: Ongoing     Problem: Falls - Risk of:  Goal: Will remain free from falls  Description: Will remain free from falls  11/3/2020 1107 by Michelle Bello RN  Outcome: Ongoing  11/3/2020 0354 by Devin He RN  Outcome: Ongoing  Goal: Absence of physical injury  Description: Absence of physical injury  11/3/2020 1107 by Michelle Bello RN  Outcome: Ongoing  11/3/2020 0354 by Devin He RN  Outcome: Ongoing     Problem: Pain:  Goal: Pain level will decrease  Description: Pain level will decrease  11/3/2020 1107 by Michelle Bello RN  Outcome: Ongoing  11/3/2020 0354 by Devin He RN  Outcome: Ongoing  Goal: Control of acute pain  Description: Control of acute pain  11/3/2020 1107 by Michelle Bello RN  Outcome: Ongoing  11/3/2020 0354 by Devin He RN  Outcome: Ongoing  Goal: Control of chronic pain  Description: Control of chronic pain  11/3/2020 1107 by Michelle Bello RN  Outcome: Ongoing  11/3/2020 0354 by Devin He RN  Outcome: Ongoing

## 2020-11-03 NOTE — PLAN OF CARE
Problem: Discharge Planning:  Goal: Discharged to appropriate level of care  Description: Discharged to appropriate level of care  Outcome: Ongoing     Problem: Mobility - Impaired:  Goal: Mobility will improve  Description: Mobility will improve  Outcome: Ongoing     Problem: Infection - Surgical Site:  Goal: Will show no infection signs and symptoms  Description: Will show no infection signs and symptoms  Outcome: Ongoing     Problem: Pain - Acute:  Goal: Pain level will decrease  Description: Pain level will decrease  Outcome: Ongoing     Problem: Falls - Risk of:  Goal: Will remain free from falls  Description: Will remain free from falls  Outcome: Ongoing  Goal: Absence of physical injury  Description: Absence of physical injury  Outcome: Ongoing     Problem: Pain:  Goal: Pain level will decrease  Description: Pain level will decrease  Outcome: Ongoing  Goal: Control of acute pain  Description: Control of acute pain  Outcome: Ongoing  Goal: Control of chronic pain  Description: Control of chronic pain  Outcome: Ongoing

## 2020-11-03 NOTE — PROGRESS NOTES
Patient provided with discharge instructions, discussed in detail, new medications reviewed including use and side effects. Patient verbalized understanding. Prescriptions filled per outpatient pharmacy. All questions answered, family at the bedside to transport home. Patient discharged home on 11/3/2020 at . Discharging unit phone number [phone].

## 2020-11-03 NOTE — OP NOTE
did receive 2 gm of Ancef  preoperatively. She was then brought back to the operating room in the  supine position. _____ anesthesia was started per the Anesthesia  Service. She was then positioned supine on the operating room table  with all bony prominences padded. A tourniquet was placed upon the  patient's right thigh and the right lower extremity at that time was  prepped and draped in the standard sterile fashion. A standard anterior midline incision was made directly over the anterior  aspect of the right knee. This incision was carried down sharply  through the skin and subcutaneous tissue. A medial parapatellar  arthrotomy at that time was performed. The patella was everted  laterally. Fat pad was excised. Soft tissue dissection continued along  the medial subperiosteal soft tissue sleeve. The joint was now fully  exposed. The patient had endmstage degenerative change that was  tricompartmental in nature. We used the drill to open up the femoral  canal.  Intramedullary guide luis was placed in the center of the femoral  canal.  Our distal femoral cutting block was pinned on the distal end of  the femur. Our distal femoral cut was completed removing 9 mm of bone  in 5 degrees of valgus. We then placed the PCL retractor and subluxed  the tibia anteriorly. Extramedullary tibial guide was used to measure  resection of 9 mm off the high point of the lateral tibial plateau. We  pinned our tibial cutting block perpendicular to the anatomic axis of  the tibia. We then completed our tibial cut with 3 degrees of posterior  slope. We brought the knee out into extension. A 5 mm spacer block had  good fit in the extension space with good balancing of the medial and  lateral soft tissues. The knee was then brought back up into flexion. We measured the size of the femur. The femur was measured to be a size  4.   We pinned our four-in-one cutting block on the distal end of the  femur at 3 degrees of external rotation. We then completed our  anterior, posterior and chamfer cuts on the femur. A box cutting guide  was then placed on the femur and the femoral box cut at that time was  also completed. Residuals of the medial and lateral meniscus were  removed at that point in time as well. The tibia at that time was then  subluxed anteriorly again with a PCL retractor. A size 4 tibial  baseplate had good fit on the proximal tibia without overhang, therefore  the tibia was prepared with a reamer followed by a keel punch. The  trial tibia was left in place and we placed our trial size 4 posterior  stabilized femoral component onto the femur and trialed with multiple  size polyethylenes. With the size 4 x 5 mm polyethylene, we had  extension to 0 degrees and flexion up to 120 degrees with good balancing  of the medial and lateral soft tissues. The patella at that time was  everted. A measured resection was done on the back surface of the  patella to remove 7 mm of bone. We then reamed lug holes for a size 35  anatomic medialized patella. We then trialed patella in place, which  demonstrated excellent patellar tracking. Trial components were removed  from the knee at that time. The knee was washed with pulse lavage. The  soft tissues were injected with an Orthomix local anesthetic solution. The knee was also washed with an IrriSept antibacterial solution and  cement was mixed on the back table at that time. Once the cement was ready, we cemented our final components starting  with a size 4 rotating platform tibial baseplate, followed by a size 4  narrow posterior stabilized femoral component, followed by a size 35  anatomic medialized patella. A size 4 x 5 mm polyethylene insert was  held in the joint space with the knee in full extension with an axial  load while we waited for the cement to fully cure.   Once the cement  fully cured, we once again trialed the knee and the size 4 x 5 mm  polyethylene insert was found to be appropriate. Therefore, a final  size 4 x 5 mm polyethylene insert was placed. The tourniquet at that  time was deflated. Electrocautery was used to obtain hemostasis within  the soft tissues. Capsular closure was done starting with a #1  Stratafix suture, 2-0 Vicryl was used to reapproximate subcutaneous  tissues, and a 3-0 Monocryl was used to close the skin. Sterile  dressings were placed over the knee. Knee was placed into an Ace wrap. The patient was awakened from anesthesia and transferred to PACU in  stable condition.         Thi Montez MD    D: 11/02/2020 10:31:15       T: 11/02/2020 14:00:24     [email or web address]  Job#: [id number]     Doc#: [id number]    CC:

## 2020-11-03 NOTE — PROGRESS NOTES
4 Eyes Skin Assessment     NAME:  Sukh Taylor  YOB: 1955  MEDICAL RECORD NUMBER:  [id number]    The patient is being assess for  Admission    I agree that 2 RN's have performed a thorough Head to Toe Skin Assessment on the patient. ALL assessment sites listed below have been assessed. Areas assessed by both nurses:    Head, Face, Ears, Shoulders, Back, Chest, Arms, Elbows, Hands, Sacrum. Buttock, Coccyx, Ischium and Legs. Feet and Heels        Does the Patient have a Wound?  No noted wound(s)       Leonard Prevention initiated:  Yes   Wound Care Orders initiated:  NA    Pressure Injury (Stage 3,4, Unstageable, DTI, NWPT, and Complex wounds) if present place consult order under [de-identified] NA    New and Established Ostomies if present place consult order under : No      Nurse 1 eSignature: Electronically signed by Humaira Vasquez RN on 11/3/20 at 3:25 AM EST    **SHARE this note so that the co-signing nurse is able to place an eSignature**    Nurse 2 eSignature: Electronically signed by Omar Palacios RN on 11/3/20 at 3:26 AM EST

## 2020-11-03 NOTE — PROGRESS NOTES
Progress Note - Dr. Jorie Castleman - Internal Medicine  PCP: Matheus Lopez DO [place] [address] [phone]    Hospital Day: 0  Code Status: Full Code  Current Diet: DIET GENERAL;        CC: follow up on medical issues    Subjective:   Sabra Velazco is a 76 y.o. female. She denies problems    Doing ok  No new issues  Has not yet worked with therapy    She denies chest pain, denies shortness of breath, denies nausea,  denies emesis. 10 system Review of Systems is reviewed with patient, and pertinent positives are noted in HPI above . Otherwise, Review of systems is negative. I have reviewed the patient's medical and social history in detail and updated the computerized patient record. To recap: She  has a past medical history of Kidney stone, Pneumonia, and Thyroid disease. . She  has a past surgical history that includes eye muscle surgery; Lithotripsy; and Tubal ligation (1995). . She  reports that she has quit smoking. She has never used smokeless tobacco. She reports previous alcohol use. She reports that she does not use drugs. .        Active Hospital Problems    Diagnosis Date Noted    Sensorineural hearing loss, bilateral [H90.3] 09/18/2020  11/2/20 RIGHT TKA [M17.11] 11/18/2019    Vitamin D deficiency [E55.9] 08/22/2016    Hypothyroidism [E03.9] 06/09/2015       Current Facility-Administered Medications: levothyroxine (SYNTHROID) tablet 88 mcg, 88 mcg, Oral, Daily  sodium chloride flush 0.9 % injection 10 mL, 10 mL, Intravenous, 2 times per day  sodium chloride flush 0.9 % injection 10 mL, 10 mL, Intravenous, PRN  acetaminophen (TYLENOL) tablet 650 mg, 650 mg, Oral, Q6H  sennosides-docusate sodium (SENOKOT-S) 8.6-50 MG tablet 1 tablet, 1 tablet, Oral, BID  magnesium hydroxide (MILK OF MAGNESIA) 400 MG/5ML suspension 30 mL, 30 mL, Oral, Daily PRN  insulin lispro (HUMALOG) injection vial 0-12 Units, 0-12 Units, Subcutaneous, TID WC  glucose (GLUTOSE) 40 % oral gel 15 g, 15 g, Oral, PRN  dextrose 50 % IV solution, 12.5 g, Intravenous, PRN  glucagon (rDNA) injection 1 mg, 1 mg, Intramuscular, PRN  dextrose 5 % solution, 100 mL/hr, Intravenous, PRN  lactated ringers infusion, , Intravenous, Continuous  oxyCODONE (ROXICODONE) immediate release tablet 5 mg, 5 mg, Oral, Q4H PRN **OR** oxyCODONE (ROXICODONE) immediate release tablet 10 mg, 10 mg, Oral, Q4H PRN  HYDROmorphone (DILAUDID) injection 0.25 mg, 0.25 mg, Intravenous, Q3H PRN **OR** HYDROmorphone (DILAUDID) injection 0.5 mg, 0.5 mg, Intravenous, Q3H PRN  promethazine (PHENERGAN) tablet 12.5 mg, 12.5 mg, Oral, Q6H PRN **OR** ondansetron (ZOFRAN) injection 4 mg, 4 mg, Intravenous, Q6H PRN  aspirin EC tablet 325 mg, 325 mg, Oral, BID  celecoxib (CELEBREX) capsule 200 mg, 200 mg, Oral, Daily  0.9 % sodium chloride bolus, 500 mL, Intravenous, PRN  potassium chloride (KLOR-CON M) extended release tablet 40 mEq, 40 mEq, Oral, PRN **OR** potassium bicarb-citric acid (EFFER-K) effervescent tablet 40 mEq, 40 mEq, Oral, PRN **OR** potassium chloride 10 mEq/100 mL IVPB (Peripheral Line), 10 mEq, Intravenous, PRN  insulin lispro (1 Unit Dial) 0-6 Units, 0-6 Units, Subcutaneous, Nightly  insulin lispro (1 Unit Dial) 0-12 Units, 0-12 Units, Subcutaneous, TID WC         Objective:  /76   Pulse 54   Temp 98 °F (36.7 °C) (Oral)   Resp 16   Ht 5' (1.524 m)   Wt 198 lb (89.8 kg)   SpO2 94%   Breastfeeding No   BMI 38.67 kg/m²      Patient Vitals for the past 24 hrs:   BP Temp Temp src Pulse Resp SpO2   11/03/20 0758 126/76 98 °F (36.7 °C) Oral 54 16 94 %   11/03/20 0451 120/70 98 °F (36.7 °C) Oral 55 16 93 %   11/02/20 2344 131/79 97.9 °F (36.6 °C) Oral 57 16 96 %   11/02/20 2142 119/80 97.4 °F (36.3 °C) Oral 66 16 --   11/02/20 2100 121/79 97.5 °F (36.4 °C) Oral 71 16 95 %   11/02/20 2040 123/82 -- -- 67 -- 95 %   11/02/20 1500 -- -- -- -- -- 96 %   11/02/20 1430 -- -- -- -- -- 95 %   11/02/20 1400 (!) 142/88 -- -- 74 16 100 %   11/02/20 1330 -- -- -- -- -- 98 %   11/02/20 1300 (!) 131/91 -- -- 66 18 100 %   11/02/20 1230 128/77 -- -- 65 17 100 %   11/02/20 1200 (!) 141/74 -- -- 69 18 100 %   11/02/20 1100 124/77 -- -- 76 16 100 %   11/02/20 1050 129/78 -- -- 76 17 --   11/02/20 1045 125/78 -- -- 86 15 --   11/02/20 1040 119/80 -- -- 84 16 --   11/02/20 1035 127/71 97 °F (36.1 °C) Temporal 75 17 98 %     Patient Vitals for the past 96 hrs (Last 3 readings):   Weight   11/02/20 0616 198 lb (89.8 kg)           Intake/Output Summary (Last 24 hours) at 11/3/2020 0921  Last data filed at 11/3/2020 0249  Gross per 24 hour   Intake 600 ml   Output 580 ml   Net 20 ml         Physical Exam:   /76   Pulse 54   Temp 98 °F (36.7 °C) (Oral)   Resp 16   Ht 5' (1.524 m)   Wt 198 lb (89.8 kg)   SpO2 94%   Breastfeeding No   BMI 38.67 kg/m²   General appearance: alert, appears stated age and cooperative  Head: Normocephalic, without obvious abnormality, atraumatic  Lungs: clear to auscultation bilaterally  Heart: regular rate and rhythm, S1, S2 normal, no murmur, click, rub or gallop  Abdomen: soft, non-tender; bowel sounds normal; no masses,  no organomegaly  Extremities: dressing CDI    Labs:  Lab Results   Component Value Date    WBC 15.0 (H) 11/03/2020    HGB 12.3 11/03/2020    HCT 36.9 11/03/2020     11/03/2020    CHOL 239 (H) 09/30/2020    TRIG 102 09/30/2020    HDL 68 (H) 09/30/2020    ALT 24 09/30/2020    AST 21 09/30/2020     11/03/2020    K 4.0 11/03/2020     11/03/2020    CREATININE 0.6 11/03/2020    BUN 20 11/03/2020    CO2 25 11/03/2020    TSH 5.96 (H) 06/02/2015    INR 0.93 10/12/2020    LABA1C 5.2 09/30/2020    LABMICR YES 10/12/2020     No results found for: CKTOTAL, CKMB, CKMBINDEX, TROPONINI    Recent Imaging Results are Reviewed:  Ct Abdomen Pelvis W Wo Contrast    Result Date: 10/30/2020  [facility]: [id number] #: [id number] Procedure: CT Abdomen/Pelvis w/wo Contrast; Reason for Exam: ASYMPTOMATIC MICROSCOPIC HEMATURIA; This document is confidential medical information. Unauthorized disclosure or use of this information is prohibited by law. If you are not the intended recipient of this document, please advise us by calling immediately [phone]. [facility]  [place]. [place], [address] Patient Name: Juan Pablo Lane Case ID: [id number] Patient : 1955 Referring Physician: Fredna Duverney, MD Exam Date: 10/30/2020 Exam Description: CT Abdomen/Pelvis w/wo Contrast HISTORY:  Asymptomatic microscopic hematuria. TECHNICAL FACTORS:  Standard CT technique was utilized before and after contrast administration. Contrast Type: Isovue 300. Contrast Amt: 100. COMPARISON:  None. FINDINGS:  Lung bases clear. Heart size within normal limits. Diffuse hepatic steatosis. Small hiatal hernia. The unenhanced biliary tree, gallbladder, pancreas, spleen and adrenals are unremarkable. At least six nonobstructing calculi are present in the right kidney, the largest of which measures approximately 5mm (axial series 4, image 182). At least six nonobstructing calculi are present in the left kidney, the largest of which measures 4.5mm (axial series 4, image 142). No hydronephrosis on either side. Evidence of a few small cysts noted in the bilateral kidneys. Unremarkable bladder. Few nabothian cysts present. Fluid-filled dilatation of the cervix present, suspicious for cervical stenosis. Consider gynecologic consultation for further evaluation. The uterus is mildly enlarged suggestive of myomatous uterus. The right ovary appears unremarkable. Approximately 2.7 x 1.3 x 1.4cm cyst present in the left ovary (axial series 7, image 67). A 2cm predominantly fat containing mass with a small mural calcification is also present in the left ovary (axial series 7, image 65), consistent with a dermoid cyst/teratoma. Small mural calcifications in the abdominal aorta. No AAA.   Mildly redundant sigmoid. No evidence of bowel obstruction or inflammation. Small fat containing umbilical hernia. L3-4 and L5-S1 vacuum phenomenon. CONCLUSION: 1. Nonobstructing calculi in the bilateral kidneys. Evidence of small cysts. No hydronephrosis on either side. Unremarkable bladder. 2. Fluid-filled dilatation of the cervix suggesting cervical stenosis. Evidence of a cyst/teratoma in the left ovary. Advise gynecologic consultation. 3. Hepatic steatosis. Thank you for the opportunity to provide your interpretation. Morgan Seth MD A: HS/tv 10/30/2020 7:51 PM T: TV 10/30/2020 5:27 PM     Xr Knee Right (1-2 Views)    Result Date: 11/2/2020  EXAMINATION: 2 XRAY VIEWS OF THE RIGHT KNEE 11/2/2020 11:12 am COMPARISON: None. HISTORY: ORDERING SYSTEM PROVIDED HISTORY: s/p right total knee TECHNOLOGIST PROVIDED HISTORY: Of operative side while in recovery room. Reason for exam:->s/p right total knee FINDINGS: Postoperative film right total knee arthroplasty demonstrates good alignment. .  Postoperative film total knee arthroplasty with good alignment       Assessment and Plan:  Principal Problem:    11/2/20 RIGHT TKA -Established problem. Improving. Doing well post op. Has not yet worked with therayp  Plan: Continue on post-operative pathway. PT/OT to see patient. Continue pain control - on IV pain meds acutely post op. Work on transitioning to oral pain meds when possible. Will follow serial h/h to monitor for acute blood loss anemia - labs ordered for tomorrow. Active Problems:    Hypothyroidism -Established problem. Stable. Plan: cont on synthroid    Vitamin D deficiency  Plan: Continue present orders/plan. Sensorineural hearing loss, bilateral-Established problem. Stable. No new issues  Plan: Continue present orders/plan.      Disp -medically stable            Erlinda Sepulveda  11/3/2020

## 2020-11-03 NOTE — PROGRESS NOTES
Patient alert and oriented, discharged to home with documented belongings. IV removed with no complications. Transported out of hospital by wheelchair. Reviewed discharge, follow up, and medication instructions with patient. Patient states understanding. Educated patient  to wear mercedes hose for 2 weeks and to remove at night and use incentive spirometer and take all medication as directed. Prescriptions filled by retail pharmacy and handed to patient.

## 2020-11-03 NOTE — CARE COORDINATION
Received a voicemail from Daisy Geronimo at [facility] and pt can be accepted.  Chintan Haddad, MSW, LSW

## 2020-11-03 NOTE — CARE COORDINATION
Referral faxed to [facility].  Electronically signed by JUANCARLOS Lutz, ALTAGRACIA on 11/3/2020 at 7:27 AM

## 2020-11-03 NOTE — PROGRESS NOTES
CLINICAL PHARMACY NOTE: MEDS TO [address] Select Patient?: No  Total # of Prescriptions Filled: 4   The following medications were delivered to the patient:  · Asa 325mg  · Celecoxib 200mg  · Oxycodone 5mg  · apap 325mg  Total # of Interventions Completed: 0  Time Spent (min): 30    Additional Documentation:  Medications delivered- patient signed  Denia Victor M

## 2020-11-03 NOTE — PROGRESS NOTES
Program;Precautions;Transfer Training;Weight-bearing Education;Equipment; Family Education;Orientation;General Safety;Gait Training;Functional Mobility Training  Patient Education: d/c recommendations-pt verbalizing understanding; HEP reviewed and pt able to recal 2 exercises-will require reinforcement  Barriers to Learning: none  REQUIRES PT FOLLOW UP: Yes  Activity Tolerance  Activity Tolerance: Patient limited by endurance; Patient Tolerated treatment well     Patient Diagnosis(es): The encounter diagnosis was Preop testing. has a past medical history of Kidney stone, Pneumonia, and Thyroid disease. has a past surgical history that includes eye muscle surgery; Lithotripsy; and Tubal ligation (1995). Restrictions  Restrictions/Precautions  Restrictions/Precautions: Weight Bearing, Fall Risk(HIGH FALL RISK)  Required Braces or Orthoses?: No  Lower Extremity Weight Bearing Restrictions  Right Lower Extremity Weight Bearing: Weight Bearing As Tolerated  Position Activity Restriction  Other position/activity restrictions: S/P R TKA 11/2/2020. Subjective   General  Chart Reviewed: Yes  Response To Previous Treatment: Not applicable  Subjective  Subjective: Pt agreeable to PT/OT. Pt reporting no pain at this time. General Comment  Comments: Pt supine in bed upon arrival          Orientation  Orientation  Overall Orientation Status: Within Functional Limits  Cognition      Objective   Bed mobility  Supine to Sit: Modified independent(HOB elevated)  Scooting: Modified independent  Transfers  Sit to Stand: Supervision  Stand to sit: Supervision  Car Transfer: Supervision  Ambulation  Ambulation?: Yes  Ambulation 1  Surface: level tile  Device: Rolling Walker  Assistance: Stand by assistance  Quality of Gait: wide Dustin, decreased R foot clearance, decreased L step length, decreased mike  Distance: 450'  Comments: Pt required increased time to perform with progressively increasing step through pattern.  No LOB.  Stairs/Curb  Stairs?: Yes  Stairs  # Steps : 4  Stairs Height: 6\"  Rails: Bilateral  Device: No Device  Assistance: Stand by assistance  Comment: VC for sequence; ascend/descend 1 4\" curb/step Supervision     Balance  Posture: Good  Sitting - Static: Good;-  Sitting - Dynamic: Good;-  Standing - Static: Good;-(with RW)  Standing - Dynamic: Fair;+(with RW)  Comments: manage clothing, steph-care and hand hygiene in standing without LOB Supervision  Exercises  Straight Leg Raise: x10 RLE  Quad Sets: x10 RLE  Knee Long Arc Quad: x10 RLE   AROM RLE (degrees)  RLE General AROM: knee: 0-15-98 degrees     Comment: applied ice pack              G-Code     OutComes Score                                                     AM-PAC Score  AM-PAC Inpatient Mobility Raw Score : 18 (11/03/20 1018)  AM-PAC Inpatient T-Scale Score : 43.63 (11/03/20 1018)  Mobility Inpatient CMS 0-100% Score: 46.58 (11/03/20 1018)  Mobility Inpatient CMS G-Code Modifier : CK (11/03/20 1018)          Goals  Short term goals  Time Frame for Short term goals: upon d/c  Short term goal 1: Pt will perform bed mobility MOD I--met 11/3  Short term goal 2: Pt will perform transfers with RW MOD I  Short term goal 3: Pt will ambulate 150' with RW MOD I  Short term goal 4: Pt will negotiate curb step with LRAD and SBA--met 11/3  Short term goal 5: Pt will perform car transfer with RW and SBA  Short term goal 6: Pt will achieve 90 degrees AROM of R knee flexion  Patient Goals   Patient goals : pt did not state  **2 met    Plan    Plan  Times per week: 7x (BID)  Times per day: Twice a day  Current Treatment Recommendations: Strengthening, ROM, Balance Training, Functional Mobility Training, Transfer Training, Stair training, Gait Training, Endurance Training, Neuromuscular Re-education, Positioning, Modalities, Equipment Evaluation, Education, & procurement, Patient/Caregiver Education & Training, Safety Education & Training, Home Exercise Program  Safety

## 2020-11-03 NOTE — PROGRESS NOTES
[facility] Orthopedic Surgery   Progress Note    CHIEF COMPLAINT/DIAGNOSIS:  11/2/20 RIGHT TKA    SUBJECTIVE: Patient sitting up in bedside chair after having worked with therapy. Reports pain is well controlled with medication and ready to DC home today. Feels well and is eating and drinking without difficulty. Urinating normally. .     OBJECTIVE  Physical    VITALS:  /76   Pulse 54   Temp 98 °F (36.7 °C) (Oral)   Resp 16   Ht 5' (1.524 m)   Wt 198 lb (89.8 kg)   SpO2 94%   Breastfeeding No   BMI 38.67 kg/m²     GENERAL: Alert, NAD  MUSCULOSKELETAL: RIGHT LE  INCISION:  Overdressing c/d/i  ROM: intact DF/PF  Sensory:  Intact to light touch in peroneal and tibial distributions. Vascular:   Intact DP pulse;  calf soft and nontender    Data    ALL MEDICATIONS HAVE BEEN REVIEWED    CBC:   Recent Labs     11/02/20  1138 11/03/20  0637   WBC 8.7 15.0*   HGB 13.8 12.3   HCT 40.7 36.9    256     BMP:   Recent Labs     11/02/20  1138 11/03/20  0637    139   K 4.7 4.0    105   CO2 27 25   BUN 11 20   CREATININE <0.5* 0.6     INR: No results for input(s): INR in the last 72 hours. ASSESSMENT:  11/2/20 RIGHT TKA, POD#1  Obesity (BMI 39)  Thyroid disorder    PLAN:   - WB status:  WBAT   - DVT prophylaxis: ASA BID  - PT/OT  - D/C Plan: home today with home PT  - Pain Control: current regimen. Due to orthopaedic surgical procedure/condition, patient may require pain medication for up to 6-8 weeks. - Leukocytosis: WBC 15. Likely reactive as asymptomatic.  - F U with Dr. Kamari Biggs in 2 weeks.       OSFIYA ANN-CNP  11/3/2020  11:18 AM    .

## 2020-11-03 NOTE — CONSULTS
Consult -Internal Medicine  Dr. Sophia Krishnamurthy  11/2/2020      PCP: Yolette Álvarez DO  Referring Physician: Teresita Yang MD    Code Status: Full Code  Current Diet: DIET GENERAL;      Cc: Yvonne Bettencourt is a78 y.o. female who presents with Primary osteoarthritis of right knee. Active Hospital Problems    Diagnosis Date Noted    Sensorineural hearing loss, bilateral [H90.3] 09/18/2020    Primary osteoarthritis of right knee [M17.11] 11/18/2019    Vitamin D deficiency [E55.9] 08/22/2016    Hypothyroidism [E03.9] 06/09/2015     HPI: Yvonne Bettencourt has been admitted by Teresita Yang MD with R knee pain. Pt with persistent R knee pain, over 8 months in duration. Rated 9/10 at worst. Constant in timing. Getting a little worse. No improvement with injections, nsaids. Pain is so severe that it limits her activty. As it has not improved with conservative measures, surgery in recommended    Pt has undergone R TKA without any apparentcomplications. Pt is doing well post operatively. Pain is controlled at this time. I have been asked to see the patient for evaluation of her internal medicine issues:  has a past medical history of Kidney stone, Pneumonia, and Thyroid disease. Benedetta Ou Home meds have been reveiwed and restartedas indicated. Pt denies having other complaints at this time. Doing ok post op  Pain controlled. No issues noted  Has not yet worked with therapy    Electronic chart reviewed  Home meds reviewed and restarted as indicated  Op note, anesthesia flowsheet reviewed  Case d/w nursing       Problem list of hospitalization thus far: Active Hospital Problems    Diagnosis    Sensorineural hearing loss, bilateral [H90.3]    Primary osteoarthritis of right knee [M17.11]    Vitamin D deficiency [E55.9]    Hypothyroidism [E03.9]         Review of Systems: (1 system for EPF, 2-9 for detailed, 10+ for comprehensive)  Review of Systems   Constitutional: Negative for chills and fever. HENT: Negative for drooling and facial swelling. Eyes: Negative for pain and redness. Respiratory: Negative for cough and shortness of breath. Cardiovascular: Negative for chest pain and leg swelling. Gastrointestinal: Negative for nausea and vomiting. Endocrine: Negative for polydipsia and polyphagia. Genitourinary: Negative for frequency and urgency. Musculoskeletal: Positive for joint swelling. Negative for back pain and neck pain. Skin: Negative for rash. Allergic/Immunologic: Negative for food allergies. Neurological: Negative for facial asymmetry and light-headedness. Hematological: Negative for adenopathy. Psychiatric/Behavioral: Negative for agitation and self-injury. Past Medical History:   Past Medical History:   Diagnosis Date    Kidney stone     Pneumonia     Thyroid disease        Past Surgical History:   Past Surgical History:   Procedure Laterality Date    EYE MUSCLE SURGERY      age 14     LITHOTRIPSY      2012, 2013     TUBAL LIGATION  1995       Social History:   Social History     Tobacco History     Smoking Status  Former Smoker    Smokeless Tobacco Use  Never Used          Alcohol History     Alcohol Use Status  Not Currently          Drug Use     Drug Use Status  Never          Sexual Activity     Sexually Active  Not Asked                Fam History: History reviewed. No pertinent family history. PFSH: The above PMHx, PSHx, SocHx, FamHx has been reviewed by myself. (1 area for detailed, 2-3 for comprehensive)      Code Status: Full Code    Meds - following list ofhome medications from electronic chart has been reviewed by myself  Prior to Admission medications    Medication Sig Start Date End Date Taking?  Authorizing Provider   levothyroxine (SYNTHROID) 88 MCG tablet Take 1 tablet by mouth Daily 1/27/20  Yes Peterson Pedro, DO   vitamin D (ERGOCALCIFEROL) 22348 UNITS CAPS capsule Take 1 capsule by mouth once a week 5/3/17  Yes Maddi Humphries Marcelo Pedro, DO   POTASSIUM CITRATE-CITRIC ACID PO Take 1,080 mg by mouth. Yes Historical Provider, MD   meloxicam (MOBIC) 15 MG tablet Take 1 tablet by mouth daily 11/18/19   Blaise Giles, DO         Allergies   Allergen Reactions    Penicillins Rash             EXAM: (2-7 system forEPF/Detailed, ?8 for Comprehensive)  BP (!) 142/88   Pulse 74   Temp 97 °F (36.1 °C) (Temporal)   Resp 16   Ht 5' (1.524 m)   Wt 198 lb (89.8 kg)   SpO2 96%   Breastfeeding No   BMI 38.67 kg/m²   Constitutional: vitals asabove: alert, appears stated age and cooperative  Psychiatric: normal insight and judgment, oriented to person, place, time, and general circumstances  Head: Normocephalic, without obvious abnormality, atraumatic  Eyes:lids and lashes normal, conjunctivae and sclerae normal and pupils equal, round, reactive to light and accomodation  EMNT: external ears normal, lips normal  Neck: no adenopathy, supple, symmetrical, trachea midline and thyroid not enlarged, symmetric, no tenderness/mass/nodules   Respiratory: clear to auscultation and percussion bilaterally with normal respiratory effort  Cardiovascular: normal rate, regular rhythm, normal S1 and S2 and no carotid bruits  Gastrointestinal: soft, non-tender, non-distended, normal bowel sounds, no masses or organomegaly  Lymphatic:   Extremities: no edema, knee dressing CDI  Skin: No rashes or nodules noted.   Neurologic:negative    LABS:  Labs Reviewed   BASIC METABOLIC PANEL - Abnormal; Notable for the following components:       Result Value    Glucose 127 (*)     CREATININE <0.5 (*)     All other components within normal limits    Narrative:     Performed at:  [facility] [address], [place], [facility] [address]   Phone [phone]   POCT GLUCOSE - Abnormal; Notable for the following components:    POC Glucose 158 (*)     All other components within normal limits    Narrative:     Performed at:  [facility] approximately 5mm (axial series 4, image 182). At least six nonobstructing calculi are present in the left kidney, the largest of which measures 4.5mm (axial series 4, image 142). No hydronephrosis on either side. Evidence of a few small cysts noted in the bilateral kidneys. Unremarkable bladder. Few nabothian cysts present. Fluid-filled dilatation of the cervix present, suspicious for cervical stenosis. Consider gynecologic consultation for further evaluation. The uterus is mildly enlarged suggestive of myomatous uterus. The right ovary appears unremarkable. Approximately 2.7 x 1.3 x 1.4cm cyst present in the left ovary (axial series 7, image 67). A 2cm predominantly fat containing mass with a small mural calcification is also present in the left ovary (axial series 7, image 65), consistent with a dermoid cyst/teratoma. Small mural calcifications in the abdominal aorta. No AAA. Mildly redundant sigmoid. No evidence of bowel obstruction or inflammation. Small fat containing umbilical hernia. L3-4 and L5-S1 vacuum phenomenon. CONCLUSION: 1. Nonobstructing calculi in the bilateral kidneys. Evidence of small cysts. No hydronephrosis on either side. Unremarkable bladder. 2. Fluid-filled dilatation of the cervix suggesting cervical stenosis. Evidence of a cyst/teratoma in the left ovary. Advise gynecologic consultation. 3. Hepatic steatosis. Thank you for the opportunity to provide your interpretation. Amy Jones MD A: HS/tv 10/30/2020 7:51 PM T: TV 10/30/2020 5:27 PM     Xr Knee Right (1-2 Views)    Result Date: 11/2/2020  EXAMINATION: 2 XRAY VIEWS OF THE RIGHT KNEE 11/2/2020 11:12 am COMPARISON: None. HISTORY: ORDERING SYSTEM PROVIDED HISTORY: s/p right total knee TECHNOLOGIST PROVIDED HISTORY: Of operative side while in recovery room. Reason for exam:->s/p right total knee FINDINGS: Postoperative film right total knee arthroplasty demonstrates good alignment.      .  Postoperative film total knee arthroplasty with good alignment         EKG:            MEDICAL DECISION MAKING:    Principal Problem:    Primary osteoarthritis of right knee -New Problem to me. Doing ok post op. Pain controlled. Has not yet worked with therapy. Post op film shows TKA in good alignment  Plan: Continue on post-operative pathway. PT/OT to see patient. Continue pain control - on IV pain meds acutely post op. Work on transitioning to oral pain meds when possible. Will follow serial h/h to monitor for acute blood loss anemia - labs ordered for tomorrow. Active Problems:    Hypothyroidism -Established problem. Stable. Plan: cont on synthroid. Monitor for sx    Vitamin D deficiency -Established problem. Stable. Plan: cont on replacement therapy    Sensorineural hearing loss, bilateral-Established problem. Stable. Plan: Continue present orders/plan. Diagnoses as listed above, designated as new or established and plan outlined for each. Data Reviewed:   (1) Lab tests were reviewed or ordered. (1) Radiology tests were reviewed or ordered. (1) Medical test (Echo, EKG, PFT/bela) were not ordered. (1) History was not obtained from someone other than patient  (1) Old records  were reviewed - see HPI/MDM for pertinent details if review done. (2) Case was discussed with another health care provider: Dr Melania Patterson  (2) Imaging was viewed by myself. (2) EKG  was not viewed by myself. Thanks for the consult! Will follow along daily while patient is in house.       Jenni Riding  11/2/2020

## 2020-11-04 ENCOUNTER — TELEPHONE (OUTPATIENT)
Dept: INPATIENT UNIT | Age: 65
End: 2020-11-04

## 2020-11-04 NOTE — TELEPHONE ENCOUNTER
Attempted to contact patient. Left voicemail for patient stating purpose and call back number.    Michael Pinto  Orthopedic Nurse Navigator  Phone number: [phone]  Future Appointments   Date Time Provider Lluvia Last   11/17/2020  2:50 PM MD RAÚL Florian [facility]

## 2020-11-05 PROCEDURE — 64447 NJX AA&/STRD FEMORAL NRV IMG: CPT | Performed by: FAMILY MEDICINE

## 2020-11-05 PROCEDURE — 76942 ECHO GUIDE FOR BIOPSY: CPT | Performed by: FAMILY MEDICINE

## 2020-11-05 NOTE — ADDENDUM NOTE
Addendum  created 11/05/20 0629 by Donta Ortiz MD    Child order released for a procedure order, Clinical Note Signed, Intraprocedure Blocks edited

## 2020-11-05 NOTE — ANESTHESIA PROCEDURE NOTES
Peripheral Block    Patient location during procedure: pre-op  Start time: 11/2/2020 7:55 AM  End time: 11/2/2020 7:59 AM  Staffing  Anesthesiologist: Chris Barajas MD  Performed: anesthesiologist   Preanesthetic Checklist  Completed: patient identified, site marked, surgical consent, pre-op evaluation, timeout performed, IV checked, risks and benefits discussed, monitors and equipment checked, anesthesia consent given, oxygen available and patient being monitored  Peripheral Block  Patient position: supine  Prep: ChloraPrep  Patient monitoring: cardiac monitor, continuous pulse ox, frequent blood pressure checks and IV access  Block type: iPacks  Laterality: right  Injection technique: single-shot  Procedures: ultrasound guided  Local infiltration: lidocaine  Infiltration strength: 1 %  Dose: 3 mL  Provider prep: mask and sterile gloves  Local infiltration: lidocaine  Needle  Needle gauge: 21 G  Needle length: 10 cm  Needle localization: ultrasound guidance  Assessment  Injection assessment: negative aspiration for heme, no paresthesia on injection and local visualized surrounding nerve on ultrasound  Paresthesia pain: none  Slow fractionated injection: yes  Hemodynamics: stable  Additional Notes  15 ml 0.25% bupi w us        Reason for block: post-op pain management and at surgeon's request

## 2020-11-05 NOTE — ANESTHESIA PROCEDURE NOTES
Peripheral Block    Patient location during procedure: pre-op  Start time: 11/2/2020 7:50 AM  End time: 11/2/2020 7:56 AM  Staffing  Anesthesiologist: Asif Montano MD  Performed: anesthesiologist   Preanesthetic Checklist  Completed: patient identified, site marked, surgical consent, pre-op evaluation, timeout performed, IV checked, risks and benefits discussed, monitors and equipment checked, anesthesia consent given, oxygen available and patient being monitored  Peripheral Block  Patient position: supine  Prep: ChloraPrep  Patient monitoring: cardiac monitor, continuous pulse ox, frequent blood pressure checks and IV access  Block type: Femoral  Laterality: right  Injection technique: single-shot  Procedures: ultrasound guided  Local infiltration: lidocaine  Infiltration strength: 1 %  Dose: 3 mL  Adductor canal (Low Femoral)  Provider prep: mask and sterile gloves  Local infiltration: lidocaine  Needle  Needle gauge: 21 G  Needle length: 10 cm  Needle localization: ultrasound guidance  Assessment  Injection assessment: negative aspiration for heme, no paresthesia on injection and local visualized surrounding nerve on ultrasound  Paresthesia pain: none  Slow fractionated injection: yes  Hemodynamics: stable  Additional Notes  30 ml 0.5% bupi w US          Reason for block: post-op pain management and at surgeon's request

## 2020-11-05 NOTE — TELEPHONE ENCOUNTER
Spoke with Patient regarding post discharge from hospital.    Incision status: no drainage, odor, or redness noted per patient    Edema/Swelling:  :mild    Wearing Teds: yes    Pain level and status: more sore today     Use of pain medications: oxycodone ; Patient stated they are taking their pain medication as prescribed. Took it twice yesterday. Taking tylenol. Use of ice therapy: Yes     Blood thinner: ASPIRIN ; Verified with patient that they are taking their anticoagulant as prescribed 2 a day. Bowels: Yes, yesterday. Home Care Agency active: yes; Claims had pt yesterday. Do you have all of your medications: Yes    Changes in medications: no    Using Incentive Spirometer?: Yes    Ortho Vitals: N/A      No other questions/concerns at this time. Follow up appointment confirmed. Encouraged patient to call Orthopedic Nurse Kevin Tinoco (#[phone]) or Orthopedic office if has any questions/concerns.        Follow up appointments:    Future Appointments   Date Time Provider Lluvia Last   11/18/2020  2:10 PM Mily Flores MD [facility]

## 2020-11-15 RX ORDER — SULFAMETHOXAZOLE AND TRIMETHOPRIM 800; 160 MG/1; MG/1
1 TABLET ORAL 2 TIMES DAILY
Qty: 14 TABLET | Refills: 0 | Status: SHIPPED | OUTPATIENT
Start: 2020-11-15 | End: 2020-11-22

## 2020-11-18 ENCOUNTER — OFFICE VISIT (OUTPATIENT)
Dept: ORTHOPEDIC SURGERY | Age: 65
End: 2020-11-18

## 2020-11-18 PROCEDURE — 99024 POSTOP FOLLOW-UP VISIT: CPT | Performed by: ORTHOPAEDIC SURGERY

## 2020-11-18 RX ORDER — OXYCODONE HYDROCHLORIDE 5 MG/1
5 TABLET ORAL EVERY 8 HOURS PRN
Qty: 21 TABLET | Refills: 0 | Status: SHIPPED | OUTPATIENT
Start: 2020-11-18 | End: 2020-11-25

## 2020-11-18 NOTE — PROGRESS NOTES
Chief Complaint    Post-Op Check (Right TKR)      History of Present Illness:  Micheal Mccann is a 76 y.o. female. Follow-up for her right knee. Status post right total knee arthroplasty. 2 weeks postoperative. Overall doing very well with the knee at this point time. Making good progress with physical therapy. She did call me over the weekend due to urinary frequency. She did not have a fever. There is no burning. We did place her onto Bactrim to help treat suspected urinary tract infection. She states that the urinary frequency has completely resolved since starting her on medication           Medical History:  Patient's medications, allergies, past medical, surgical, social and family histories were reviewed and updated as appropriate. Review of Systems:  Pertinent items are noted in HPI  Review of systems reviewed from Patient History Form dated on 11/18/20 and available in the patient's chart under the Media tab. Vital Signs: There were no vitals taken for this visit. General Exam:   Constitutional: Patient is adequately groomed with no evidence of malnutrition  DTRs: Deep tendon reflexes are intact  Mental Status: The patient is oriented to time, place and person. The patient's mood and affect are appropriate. Knee Examination:    Inspection: Surgical incision well-healed over the anterior aspect of the right knee    Palpation: No significant palpable effusion within the right knee today    Range of Motion: Extension is 0 degrees with knee flexion today to 100 degrees    Strength: She is able to do straight leg raise    Special Tests: Negative Homans' sign. No instability to varus and valgus stress testing    Skin: There are no rashes, ulcerations or lesions. Gait: Using a walker        Additional Comments:       Additional Examinations:         Left Lower Extremity: Examination of the left lower extremity does not show any tenderness, deformity or injury.   Range of motion is unremarkable. There is no gross instability. There are no rashes, ulcerations or lesions. Strength and tone are normal.    Radiology:     X-rays obtained and reviewed in office:  Views 3 views of the right knee demonstrate surgical hardware intact in good position. No evidence of fracture dislocation    Assessment : Status post right total knee arthroplasty    Impression:  Encounter Diagnosis   Name Primary?  11/2/20 RIGHT TKA Yes       Office Procedures:  Orders Placed This Encounter   Procedures    XR KNEE RIGHT (3 VIEWS)     Standing Status:   Future     Number of Occurrences:   1     Standing Expiration Date:   11/18/2021       Treatment Plan: Overall doing very well with the knee at this point time. She is going to continue on with physical therapy.   I am going to see her back in clinic in 4 weeks for follow-up to recheck range of motion

## 2020-11-27 ENCOUNTER — TELEPHONE (OUTPATIENT)
Dept: FAMILY MEDICINE CLINIC | Age: 65
End: 2020-11-27

## 2020-12-04 RX ORDER — OXYCODONE HYDROCHLORIDE 5 MG/1
5 TABLET ORAL EVERY 8 HOURS PRN
Qty: 21 TABLET | Refills: 0 | Status: SHIPPED | OUTPATIENT
Start: 2020-12-04 | End: 2020-12-11

## 2020-12-07 ENCOUNTER — HOSPITAL ENCOUNTER (OUTPATIENT)
Dept: PHYSICAL THERAPY | Age: 65
Setting detail: THERAPIES SERIES
Discharge: HOME | End: 2020-12-07
Payer: MEDICARE

## 2020-12-07 PROCEDURE — 97140 MANUAL THERAPY 1/> REGIONS: CPT | Performed by: PHYSICAL THERAPIST

## 2020-12-07 PROCEDURE — 97161 PT EVAL LOW COMPLEX 20 MIN: CPT | Performed by: PHYSICAL THERAPIST

## 2020-12-07 PROCEDURE — 97110 THERAPEUTIC EXERCISES: CPT | Performed by: PHYSICAL THERAPIST

## 2020-12-10 ENCOUNTER — HOSPITAL ENCOUNTER (OUTPATIENT)
Dept: PHYSICAL THERAPY | Age: 65
Setting detail: THERAPIES SERIES
Discharge: HOME | End: 2020-12-10
Payer: MEDICARE

## 2020-12-10 PROCEDURE — 97140 MANUAL THERAPY 1/> REGIONS: CPT | Performed by: PHYSICAL THERAPIST

## 2020-12-10 PROCEDURE — 97110 THERAPEUTIC EXERCISES: CPT | Performed by: PHYSICAL THERAPIST

## 2020-12-10 NOTE — FLOWSHEET NOTE
care, mobility, lifting, ambulation. [x] (20611) Provided verbal/tactile cueing for activities related to improving balance, coordination, kinesthetic sense, posture, motor skill, proprioception to assist with LE, proximal hip, and core control in self-care, mobility, lifting, ambulation and eccentric single leg control. NMR and Therapeutic Activities:    [x] (06707 or 09688) Provided verbal/tactile cueing for activities related to improving balance, coordination, kinesthetic sense, posture, motor skill, proprioception and motor activation to allow for proper function of core, proximal hip and LE with self-care and ADLs and functional mobility.   [] (52684) Gait Re-education- Provided training and instruction to the patient for proper LE, core and proximal hip recruitment and positioning and eccentric body weight control with ambulation re-education including up and down stairs     Home Exercise Program:    [x] (39224) Reviewed/Progressed HEP activities related to strengthening, flexibility, endurance, ROM of core, proximal hip and LE for functional self-care, mobility, lifting and ambulation/stair navigation   [] (64691) Reviewed/Progressed HEP activities related to improving balance, coordination, kinesthetic sense, posture, motor skill, proprioception of core, proximal hip and LE for self-care, mobility, lifting, and ambulation/stair navigation      Manual Treatments:  PROM / STM / Oscillations-Mobs:  G-I, II, III, IV (PA's, Inf., Post.)  [x] (31384) Provided manual therapy to mobilize LE, proximal hip and/or LS spine soft tissue/joints for the purpose of modulating pain, promoting relaxation, increasing ROM, reducing/eliminating soft tissue swelling/inflammation/restriction, improving soft tissue extensibility and allowing for proper ROM for normal function with self-care, mobility, lifting and ambulation. Modalities:  CP x 15'   [] GAME READY (VASO)- for significant edema, swelling, pain control. Return to Play: (if applicable)   []  Stage 1: Intro to Strength   []  Stage 2: Return to Run and Strength   []  Stage 3: Return to Jump and Strength   []  Stage 4: Dynamic Strength and Agility   []  Stage 5: Sport Specific Training     []  Ready to Return to Play, Meets All Above Stages   []  Not Ready for Return to Sports   Comments:                               PLAN: See eval  [x] Continue per plan of care [] Alter current plan (see comments above)  [] Plan of care initiated [] Hold pending MD visit [] Discharge      Electronically signed by: Nayeli Lunsford PT, OMT-C      Note: If patient does not return for scheduled/ recommended follow up visits, this note will serve as a discharge from care along with most recent update on progress.

## 2020-12-10 NOTE — FLOWSHEET NOTE
[facility] Orthopaedics and [address] [place] [address]; Rj Nash           Physical Therapy Treatment Note/ Progress Report:           Date:  2020  Patient Name:  Opal Mora    :  1955  MRN: [id number]  Restrictions/Precautions:    Medical/Treatment Diagnosis Information:  · Diagnosis: M17.11 (ICD-10-CM) - Primary osteoarthritis of right knee  · Treatment Diagnosis: right knee pain - R40.888  Insurance/Certification information:     Physician Information:  Referring Practitioner: dr Linda Schwarz  Has the plan of care been signed (Y/N):        []  Yes  [x]  No     Date of Patient follow up with Physician:      Is this a Progress Report:     []  Yes  [x]  No        If Yes:  Date Range for reporting period:  Beginning  Ending    Progress report will be due (10 Rx or 30 days whichever is less):       Recertification will be due (POC Duration  / 90 days whichever is less):         Visit # Insurance Allowable Auth Required   1  []  Yes []  No        Functional Scale:    Date assessed:       Latex Allergy:  [x]NO      []YES  Preferred Language for Healthcare:   [x]English       []other:      Pain level:  7/10     SUBJECTIVE:  See eval    OBJECTIVE: See eval   Observation:    Test measurements:      RESTRICTIONS/PRECAUTIONS:   Exercises/Interventions:       Therapeutic Ex (33345) Sets/sec Reps Notes/CUES   Heel slides  10 7    Quad sets   30x     slr's (flex, abd)   20    Saq/laq   25x                                                    Manual Intervention ([id number])      Prom/stm   15'                                                                                                                                             Therapeutic Exercise and NMR EXR  [x] (14600) Provided verbal/tactile cueing for activities related to strengthening, flexibility, endurance, ROM for improvements in LE, proximal hip, and core control with self care, mobility, lifting, ambulation.   [x] (31590) Provided verbal/tactile cueing for activities related to improving balance, coordination, kinesthetic sense, posture, motor skill, proprioception to assist with LE, proximal hip, and core control in self-care, mobility, lifting, ambulation and eccentric single leg control. NMR and Therapeutic Activities:    [x] (08711 or 86347) Provided verbal/tactile cueing for activities related to improving balance, coordination, kinesthetic sense, posture, motor skill, proprioception and motor activation to allow for proper function of core, proximal hip and LE with self-care and ADLs and functional mobility.   [] (45275) Gait Re-education- Provided training and instruction to the patient for proper LE, core and proximal hip recruitment and positioning and eccentric body weight control with ambulation re-education including up and down stairs     Home Exercise Program:    [x] (53418) Reviewed/Progressed HEP activities related to strengthening, flexibility, endurance, ROM of core, proximal hip and LE for functional self-care, mobility, lifting and ambulation/stair navigation   [] (44990) Reviewed/Progressed HEP activities related to improving balance, coordination, kinesthetic sense, posture, motor skill, proprioception of core, proximal hip and LE for self-care, mobility, lifting, and ambulation/stair navigation      Manual Treatments:  PROM / STM / Oscillations-Mobs:  G-I, II, III, IV (PA's, Inf., Post.)  [x] (03435) Provided manual therapy to mobilize LE, proximal hip and/or LS spine soft tissue/joints for the purpose of modulating pain, promoting relaxation, increasing ROM, reducing/eliminating soft tissue swelling/inflammation/restriction, improving soft tissue extensibility and allowing for proper ROM for normal function with self-care, mobility, lifting and ambulation. Modalities:     [] GAME READY (VASO)- for significant edema, swelling, pain control.      Charges:  Timed Code Treatment Minutes: 28' Total Treatment Minutes: 72'       [] EVAL (LOW) 63587 (typically 20 minutes face-to-face)  [] EVAL (MOD) 85117 (typically 30 minutes face-to-face)  [] EVAL (HIGH) 63442 (typically 45 minutes face-to-face)  [] RE-EVAL     [] QW(17489) x     [] IONTO  [] NMR (81025) x     [] VASO  [] Manual (07829) x     [] Other:  [] TA x      [] Mech Traction (17312)  [] ES(attended) (09950)      [] ES (un) (31713):         ASSESSMENT:  See eval      GOALS:   Patient stated goal:     [] Progressing: [] Met: [] Not Met: [] Adjusted    Therapist goals for Patient:   Short Term Goals: To be achieved in: 2 weeks  1. Independent in HEP and progression per patient tolerance, in order to prevent re-injury. [] Progressing: [] Met: [] Not Met: [] Adjusted   2. Patient will have a decrease in pain to facilitate improvement in movement, function, and ADLs as indicated by Functional Deficits. [] Progressing: [] Met: [] Not Met: [] Adjusted    Long Term Goals: To be achieved in:  Overall Progression Towards Functional goals/ Treatment Progress Update:  [] Patient is progressing as expected towards functional goals listed. [] Progression is slowed due to complexities/Impairments listed. [] Progression has been slowed due to co-morbidities.   [x] Plan just implemented, too soon to assess goals progression <30days   [] Goals require adjustment due to lack of progress  [] Patient is not progressing as expected and requires additional follow up with physician  [] Other    Prognosis for POC: [x] Good [] Fair  [] Poor      Patient requires continued skilled intervention: [x] Yes  [] No    Treatment/Activity Tolerance:  [x] Patient able to complete treatment  [] Patient limited by fatigue  [] Patient limited by pain    [] Patient limited by other medical complications  [] Other:         Return to Play: (if applicable)   []  Stage 1: Intro to Strength   []  Stage 2: Return to Run and Strength   []  Stage 3: Return to Jump and Strength   [] Stage 4: Dynamic Strength and Agility   []  Stage 5: Sport Specific Training     []  Ready to Return to Play, Meets All Above Stages   []  Not Ready for Return to Sports   Comments:                               PLAN: See eval  [] Continue per plan of care [] Alter current plan (see comments above)  [x] Plan of care initiated [] Hold pending MD visit [] Discharge      Electronically signed by:  Keyana Nicolas PT, MPT     Note: If patient does not return for scheduled/ recommended follow up visits, this note will serve as a discharge from care along with most recent update on progress.

## 2020-12-11 NOTE — PLAN OF CARE
[facility]             Functional Disability Index:   54% lefs     Pain Scale: 3/10     Easing factors: rest     Provocative factors: Motion/wbing     Night Pain:    - complained of night pain associated with sleep position. Type:   - Constant          Paresthesia complaints:   - no paresthesia complaints       Functional Limitations/Impairments:   - Standing   - Walking      - Squatting/bending    - Stairs             - ADL's   - Sports/Recreation         Occupation/School:   - retired      Living Status/Prior Level of Function: This patient was independent in ADL's and IADL's prior to onset of symptoms. Sport/recreational activities:     - cardiovascular training         PACEMAKER:  - Denied having a pacemaker that would contraindicate the use of electrical modalities. METAL IMPLANTS:  - Denied metal implants that would contraindicate the use of thermal modalities. CANCER HISTORY:  - Denied a history of cancer that would contraindicate thermal modalities. OBJECTIVE:        Joint mobility:     Hypo      Palpation:     Functional Mobility/Transfers:     Posture:     Circumferential Measures:  +2.0 cm patellar asymmetry     ROM: -5 to 55 degrees     Strength/Neuromuscular control:  3-/5 right knee     Bandages/Dressings/Incisions:     Gait: decreased right stance ran - moderate     Dermatomal Sensation:  - Dermatomal sensation was intact to light touch bilaterally throughout upper and lower extremities. Orthopedic Special Tests:                [x] Patient history, allergies, meds reviewed. Medical chart reviewed. See intake form. Review Of Systems (ROS):  [x]Performed Review of systems (Integumentary, CardioPulmonary, Neurological) by intake and observation. Intake form has been scanned into medical record. Patient has been instructed to contact their primary care physician regarding ROS issues if not already being addressed at this time.       Objective Review of Systems:  Cognitive, Communication, Behavior and Learning:  - The patient was alert, spoke coherently and was oriented to person, place and time. - Demonstrated no barriers to communication or processing information.  - Appeared literate, spoke [place] and had no significant hearing or vision impairment. - Had no abnormal behavioral responses, learning preferences or educational needs. Cardiopulmonary:  Edema:     Integumentary:  Nail beds:  Skin appearance:    Co-morbidities/Complexities (which may affect course of rehabilitation):   []Morbid obesity (E66.01)   []Uncontrolled HTN (I10) []DM type 1(E10.65) or 2 (E11.65) []RA(M05.9)/OA(M19.91)  []None  [x]other: oa;   Pneumonia;  Kidney stone     MEDICARE CAP EXCEPTION DOCUMENTATION  I certify that this patient meets one of the below criteria necessary for becoming an exception to the Medicare cap on therapy services:  [x]  The patient has a condition that has a direct and significant impact on the need for therapy. (Significantly impacts the rate of recovery)  []  The patient has a complexity that has a direct and significant impact on the need for therapy. (Significantly impacts the rate of recovery and is associated with a primary condition.)       []  The patient has associated variables that influence the amount of treatment to include:  Social support, self-efficacy/motivation, prognosis, time since onset/acuity. []  The patient has generalized musculoskeletal conditions or a condition affecting multiple sites that will have a direct impact on the rate of recovery. []  The patient had a prior episode of outpatient therapy during this calendar year for a different condition. []  The patient has a mental or cognitive disorder in addition to the condition being treated that will have a direct and significant impact on the rate of recovery. Falls Risk Assessment (30 days):   [x] Falls Risk assessed and no intervention required.   [] Falls Risk assessed and Patient requires intervention due to being higher risk   TUG score (>12s at risk):     [] Falls education provided, including         ASSESSMENT:    The patient demonstrated at least   53% but less than 55% impairment, limitation or restriction in:   - walking and moving around (mobility)   Functional Impairments:     [x]Noted lumbar/proximal hip/LE hypomobility   [x]Decreased LE functional ROM   [x]Decreased core/proximal hip strength and neuromuscular control   [x]Decreased LE functional strength   [x]Reduced balance/proprioceptive control   []other:      Functional Activity Limitations (from functional questionnaire and intake)   [x]Reduced ability to tolerate prolonged functional positions   [x]Reduced ability or difficulty with changes of positions or transfers between positions   [x]Reduced ability to maintain good posture and demonstrate good body mechanics with sitting, bending, and lifting   [x]Reduced ability to sleep   [x] Reduced ability or tolerance with driving and/or computer work   [x]Reduced ability to perform lifting, carrying tasks   [x]Reduced ability to squat   [x]Reduced ability to forward bend   [x]Reduced ability to ambulate prolonged functional periods/distances/surfaces   [x]Reduced ability to ascend/descend stairs   [x]Reduced ability to run, hop or jump    Participation Restrictions   []Reduced participation in self care activities   []Reduced participation in home management activities   []Reduced participation in work activities   []Reduced participation in social activities. []Reduced participation in sport activities. Classification :    [x]Signs/symptoms consistent with post-surgical status including decreased ROM, strength and function.    []Signs/symptoms consistent with joint sprain/strain   []Signs/symptoms consistent with patella-femoral syndrome   [x]Signs/symptoms consistent with knee OA/hip OA   []Signs/symptoms consistent with internal derangement of knee/Hip   []Signs/symptoms consistent with functional hip weakness/NMR control      []Signs/symptoms consistent with tendinitis/tendinosis    []signs/symptoms consistent with pathology which may benefit from Dry needling      []other:    Classification :   - Signs/symptoms consistent with post-surgical status including decreased ROM, strength and function. -- joint arthroplasty. (Pattern 4H)  - bony or soft tissue surgical procedure. (Pattern 4I)        Prognosis/Rehab Potential:       - Good        Factors affecting rehab potential:  -- associated co-morbidities    Tolerance of evaluation/treatment:     - Good     Physical Therapy Evaluation Complexity Justification  [x] A history of present problem with:  [x] no personal factors and/or comorbidities that impact the plan of care;  []1-2 personal factors and/or comorbidities that impact the plan of care  []3 personal factors and/or comorbidities that impact the plan of care  [x] An examination of body systems using standardized tests and measures addressing any of the following: body structures and functions (impairments), activity limitations, and/or participation restrictions;:  [x] a total of 1-2 or more elements   [] a total of 3 or more elements   [] a total of 4 or more elements   [] A clinical presentation with:  [] stable and/or uncomplicated characteristics   [x] evolving clinical presentation with changing characteristics  [] unstable and unpredictable characteristics;   [x] Clinical decision making of [x] low, [] moderate, [] high complexity using standardized patient assessment instrument and/or measurable assessment of functional outcome.     [x] EVAL (LOW) 84704 (typically 30 minutes face-to-face)  [] EVAL (MOD) 93683 (typically 30 minutes face-to-face)  [] EVAL (HIGH) 72756 (typically 45 minutes face-to-face)  [] RE-EVAL         Co-morbidities/Complexities (which will affect course of rehabilitation):   []None           Arthritic conditions   []Rheumatoid arthritis proper joint functioning as indicated by patients Functional Deficits. - Patient will demonstrate an increase in Strength to full and painfree to resistance testing to allow for proper functional mobility as indicated by patients Functional Deficits. - Patient will return to desired, higher level,  functional activities without increased symptoms or restriction.     - patient will demonstrate only mild qualitative deviations with gait on level surface     Electronically signed by:  Florencia Velez PT, MPT

## 2020-12-15 ENCOUNTER — HOSPITAL ENCOUNTER (OUTPATIENT)
Dept: PHYSICAL THERAPY | Age: 65
Setting detail: THERAPIES SERIES
Discharge: HOME | End: 2020-12-15
Payer: MEDICARE

## 2020-12-15 PROCEDURE — 97112 NEUROMUSCULAR REEDUCATION: CPT | Performed by: SPECIALIST/TECHNOLOGIST

## 2020-12-15 PROCEDURE — 97110 THERAPEUTIC EXERCISES: CPT | Performed by: SPECIALIST/TECHNOLOGIST

## 2020-12-15 NOTE — FLOWSHEET NOTE
[facility] Orthopaedics and [address] [place] [address]; [facility]           Physical Therapy Treatment Note/ Progress Report:           Date:  2020  Patient Name:  Micheal Mccann    :  1955  MRN: [id number]  Restrictions/Precautions:    Medical/Treatment Diagnosis Information:  · Diagnosis: M17.11 (ICD-10-CM) - Primary osteoarthritis of right knee  · Treatment Diagnosis: right knee pain - O32.165  Insurance/Certification information:     Physician Information:  Referring Practitioner: dr Noam North  Has the plan of care been signed (Y/N):        []  Yes  [x]  No     Date of Patient follow up with Physician:      Is this a Progress Report:     []  Yes  [x]  No        If Yes:  Date Range for reporting period:  Beginning  Ending    Progress report will be due (10 Rx or 30 days whichever is less):       Recertification will be due (POC Duration  / 90 days whichever is less):         Visit # Insurance Allowable Auth Required   3  []  Yes []  No        Functional Scale:    Date assessed:       Latex Allergy:  [x]NO      []YES  Preferred Language for Healthcare:   [x]English       []other:      Pain level:  5/10     SUBJECTIVE:  Pt continues to have a difficult time sleeping. Notes she has pain with sitting for prolonged periods.     OBJECTIVE: See eval  ? Observation:   ? Test measurements:      RESTRICTIONS/PRECAUTIONS:   Exercises/Interventions:       Therapeutic Ex (50585) Sets/sec Reps Notes/CUES   Heel slides  10 7    Quad sets   30x     slr's (flex, abd)   30x each    Saq/laq   30x    bridge  3x10    BS 10\" x 10      Calf raises 3 10    STD ham curls 3 10 0#   flexinator 5x1''  114 degrees                     Manual Intervention (82127)      Prom/stm   15'     Scar massage  3'                                                                                                                                      Therapeutic Exercise and NMR EXR [x] (39586) Provided verbal/tactile cueing for activities related to strengthening, flexibility, endurance, ROM for improvements in LE, proximal hip, and core control with self care, mobility, lifting, ambulation. [x] (71687) Provided verbal/tactile cueing for activities related to improving balance, coordination, kinesthetic sense, posture, motor skill, proprioception to assist with LE, proximal hip, and core control in self-care, mobility, lifting, ambulation and eccentric single leg control.      NMR and Therapeutic Activities:    [x] (01526 or 45914) Provided verbal/tactile cueing for activities related to improving balance, coordination, kinesthetic sense, posture, motor skill, proprioception and motor activation to allow for proper function of core, proximal hip and LE with self-care and ADLs and functional mobility.   [] (53045) Gait Re-education- Provided training and instruction to the patient for proper LE, core and proximal hip recruitment and positioning and eccentric body weight control with ambulation re-education including up and down stairs     Home Exercise Program:    [x] (09159) Reviewed/Progressed HEP activities related to strengthening, flexibility, endurance, ROM of core, proximal hip and LE for functional self-care, mobility, lifting and ambulation/stair navigation   [] (23076) Reviewed/Progressed HEP activities related to improving balance, coordination, kinesthetic sense, posture, motor skill, proprioception of core, proximal hip and LE for self-care, mobility, lifting, and ambulation/stair navigation      Manual Treatments:  PROM / STM / Oscillations-Mobs:  G-I, II, III, IV (PA's, Inf., Post.) [x] (16862) Provided manual therapy to mobilize LE, proximal hip and/or LS spine soft tissue/joints for the purpose of modulating pain, promoting relaxation, increasing ROM, reducing/eliminating soft tissue swelling/inflammation/restriction, improving soft tissue extensibility and allowing for proper ROM for normal function with self-care, mobility, lifting and ambulation. Modalities:  CP x 15'   [] GAME READY (VASO)- for significant edema, swelling, pain control. Charges:  Timed Code Treatment Minutes: 45   Total Treatment Minutes: 60      [] EVAL (LOW) 21471 (typically 20 minutes face-to-face)  [] EVAL (MOD) 98907 (typically 30 minutes face-to-face)  [] EVAL (HIGH) 80561 (typically 45 minutes face-to-face)  [] RE-EVAL     [x] IZ(96964) x 2    [] IONTO  [] NMR (72076) x     [] VASO  [x] Manual (55421) x  1   [] Other:  [] TA x      [] Mech Traction (50418)  [] ES(attended) (91128)      [] ES (un) (34198):         ASSESSMENT:  Patient is very guarded with manual mobilization and PROM. Fatigued easily with quad and glut strengthening. Improved gait pattern with verbal cues. GOALS:   Patient stated goal:     [] Progressing: [] Met: [] Not Met: [] Adjusted    Therapist goals for Patient:   Short Term Goals: To be achieved in: 2 weeks  1. Independent in HEP and progression per patient tolerance, in order to prevent re-injury. [] Progressing: [] Met: [] Not Met: [] Adjusted   2. Patient will have a decrease in pain to facilitate improvement in movement, function, and ADLs as indicated by Functional Deficits. [] Progressing: [] Met: [] Not Met: [] Adjusted    Long Term Goals: To be achieved in:  Overall Progression Towards Functional goals/ Treatment Progress Update:  [] Patient is progressing as expected towards functional goals listed. [] Progression is slowed due to complexities/Impairments listed. [] Progression has been slowed due to co-morbidities. [x] Plan just implemented, too soon to assess goals progression <30days   [] Goals require adjustment due to lack of progress  [] Patient is not progressing as expected and requires additional follow up with physician  [] Other    Prognosis for POC: [x] Good [] Fair  [] Poor      Patient requires continued skilled intervention: [x] Yes  [] No    Treatment/Activity Tolerance:  [x] Patient able to complete treatment  [] Patient limited by fatigue  [] Patient limited by pain    [] Patient limited by other medical complications  [] Other:         Return to Play: (if applicable)   []  Stage 1: Intro to Strength   []  Stage 2: Return to Run and Strength   []  Stage 3: Return to Jump and Strength   []  Stage 4: Dynamic Strength and Agility   []  Stage 5: Sport Specific Training     []  Ready to Return to Play, Meets All Above Stages   []  Not Ready for Return to Sports   Comments:                               PLAN: See eval  [x] Continue per plan of care [] Alter current plan (see comments above)  [] Plan of care initiated [] Hold pending MD visit [] Discharge      Electronically signed by: Luis E Hernandez PTA, ATC      Note: If patient does not return for scheduled/ recommended follow up visits, this note will serve as a discharge from care along with most recent update on progress.

## 2020-12-17 ENCOUNTER — HOSPITAL ENCOUNTER (OUTPATIENT)
Dept: PHYSICAL THERAPY | Age: 65
Setting detail: THERAPIES SERIES
Discharge: HOME | End: 2020-12-17
Payer: MEDICARE

## 2020-12-17 PROCEDURE — 97140 MANUAL THERAPY 1/> REGIONS: CPT | Performed by: SPECIALIST/TECHNOLOGIST

## 2020-12-17 PROCEDURE — 97110 THERAPEUTIC EXERCISES: CPT | Performed by: SPECIALIST/TECHNOLOGIST

## 2020-12-17 PROCEDURE — 97112 NEUROMUSCULAR REEDUCATION: CPT | Performed by: SPECIALIST/TECHNOLOGIST

## 2020-12-17 NOTE — FLOWSHEET NOTE
[facility] Orthopaedics and [address] [place] [address]; [place] [address]           Physical Therapy Treatment Note/ Progress Report:           Date:  2020  Patient Name:  Ti Hidalgo    :  1955  MRN: [id number]  Restrictions/Precautions:    Medical/Treatment Diagnosis Information:  · Diagnosis: M17.11 (ICD-10-CM) - Primary osteoarthritis of right knee  · Treatment Diagnosis: right knee pain - N64.355  Insurance/Certification information:     Physician Information:  Referring Practitioner: dr Petros Degroot  Has the plan of care been signed (Y/N):        []  Yes  [x]  No     Date of Patient follow up with Physician:      Is this a Progress Report:     []  Yes  [x]  No        If Yes:  Date Range for reporting period:  Beginning  Ending    Progress report will be due (10 Rx or 30 days whichever is less):       Recertification will be due (POC Duration  / 90 days whichever is less):         Visit # Insurance Allowable Auth Required   4  []  Yes []  No        Functional Scale:    Date assessed:       Latex Allergy:  [x]NO      []YES  Preferred Language for Healthcare:   [x]English       []other:      Pain level:  5/10     SUBJECTIVE:  Pt notes that her knee is feeling ok today. Improved since last visit.      OBJECTIVE: See eval  ? Observation:   ? Test measurements:      RESTRICTIONS/PRECAUTIONS:   Exercises/Interventions:       Therapeutic Ex (59644) Sets/sec Reps Notes/CUES   Heel slides  10 7    Quad sets   30x     slr's (flex, abd)   30x each    Saq/laq   30x    bridge  3x10    BS   10\" x 10    Calf raises 3 10    STD ham curls 3 10 0#   flexinator 5x1''  115 degrees                     Manual Intervention (21802)      Prom/stm   15'     Scar massage  3'                                                                                                                                      Therapeutic Exercise and NMR EXR [x] (70779) Provided verbal/tactile cueing for activities related to strengthening, flexibility, endurance, ROM for improvements in LE, proximal hip, and core control with self care, mobility, lifting, ambulation. [x] (76267) Provided verbal/tactile cueing for activities related to improving balance, coordination, kinesthetic sense, posture, motor skill, proprioception to assist with LE, proximal hip, and core control in self-care, mobility, lifting, ambulation and eccentric single leg control.      NMR and Therapeutic Activities:    [x] (36622 or 75719) Provided verbal/tactile cueing for activities related to improving balance, coordination, kinesthetic sense, posture, motor skill, proprioception and motor activation to allow for proper function of core, proximal hip and LE with self-care and ADLs and functional mobility.   [] (45700) Gait Re-education- Provided training and instruction to the patient for proper LE, core and proximal hip recruitment and positioning and eccentric body weight control with ambulation re-education including up and down stairs     Home Exercise Program:    [x] (02221) Reviewed/Progressed HEP activities related to strengthening, flexibility, endurance, ROM of core, proximal hip and LE for functional self-care, mobility, lifting and ambulation/stair navigation   [] (25651) Reviewed/Progressed HEP activities related to improving balance, coordination, kinesthetic sense, posture, motor skill, proprioception of core, proximal hip and LE for self-care, mobility, lifting, and ambulation/stair navigation      Manual Treatments:  PROM / STM / Oscillations-Mobs:  G-I, II, III, IV (PA's, Inf., Post.) [x] (01691) Provided manual therapy to mobilize LE, proximal hip and/or LS spine soft tissue/joints for the purpose of modulating pain, promoting relaxation, increasing ROM, reducing/eliminating soft tissue swelling/inflammation/restriction, improving soft tissue extensibility and allowing for proper ROM for normal function with self-care, mobility, lifting and ambulation. Modalities:  CP x 10'   [] GAME READY (VASO)- for significant edema, swelling, pain control. Charges:  Timed Code Treatment Minutes: 55   Total Treatment Minutes: 65      [] EVAL (LOW) 24331 (typically 20 minutes face-to-face)  [] EVAL (MOD) 84861 (typically 30 minutes face-to-face)  [] EVAL (HIGH) 31378 (typically 45 minutes face-to-face)  [] RE-EVAL     [x] OR(17364) x 2    [] IONTO  [] NMR (40952) x     [] VASO  [x] Manual (04144) x  1   [] Other:  [] TA x      [] Mech Traction (10103)  [] ES(attended) (91730)      [] ES (un) (51424):         ASSESSMENT:  Patient is very guarded with manual mobilization and PROM. Fatigued easily with quad and glut strengthening. Improved gait pattern with verbal cues. GOALS:   Patient stated goal:     [] Progressing: [] Met: [] Not Met: [] Adjusted    Therapist goals for Patient:   Short Term Goals: To be achieved in: 2 weeks  1. Independent in HEP and progression per patient tolerance, in order to prevent re-injury. [] Progressing: [] Met: [] Not Met: [] Adjusted   2. Patient will have a decrease in pain to facilitate improvement in movement, function, and ADLs as indicated by Functional Deficits. [] Progressing: [] Met: [] Not Met: [] Adjusted    Long Term Goals: To be achieved in:  Overall Progression Towards Functional goals/ Treatment Progress Update:  [] Patient is progressing as expected towards functional goals listed. [] Progression is slowed due to complexities/Impairments listed. [] Progression has been slowed due to co-morbidities. [x] Plan just implemented, too soon to assess goals progression <30days   [] Goals require adjustment due to lack of progress  [] Patient is not progressing as expected and requires additional follow up with physician  [] Other    Prognosis for POC: [x] Good [] Fair  [] Poor      Patient requires continued skilled intervention: [x] Yes  [] No    Treatment/Activity Tolerance:  [x] Patient able to complete treatment  [] Patient limited by fatigue  [] Patient limited by pain    [] Patient limited by other medical complications  [] Other:         Return to Play: (if applicable)   []  Stage 1: Intro to Strength   []  Stage 2: Return to Run and Strength   []  Stage 3: Return to Jump and Strength   []  Stage 4: Dynamic Strength and Agility   []  Stage 5: Sport Specific Training     []  Ready to Return to Play, Meets All Above Stages   []  Not Ready for Return to Sports   Comments:                               PLAN: See eval  [x] Continue per plan of care [] Alter current plan (see comments above)  [] Plan of care initiated [] Hold pending MD visit [] Discharge      Electronically signed by: Max Collet PTA, ATC      Note: If patient does not return for scheduled/ recommended follow up visits, this note will serve as a discharge from care along with most recent update on progress.

## 2020-12-18 ENCOUNTER — OFFICE VISIT (OUTPATIENT)
Dept: ORTHOPEDIC SURGERY | Age: 65
End: 2020-12-18

## 2020-12-18 PROCEDURE — 99024 POSTOP FOLLOW-UP VISIT: CPT | Performed by: ORTHOPAEDIC SURGERY

## 2020-12-18 NOTE — PROGRESS NOTES
Chief Complaint    Follow-up (right TKR)      History of Present Illness:  Dontrell Carcamo is a 76 y.o. female. She is here today for follow-up for her right knee. She is status post right total knee arthroplasty. Overall doing well with the knee at this point time. Making good progress in physical therapy. Has been measured up to 115 degrees of flexion in physical therapy. States her pain is been well controlled. Medical History:  Patient's medications, allergies, past medical, surgical, social and family histories were reviewed and updated as appropriate. Review of Systems:  Pertinent items are noted in HPI  Review of systems reviewed from Patient History Form dated on 12/18/20 and available in the patient's chart under the Media tab. Vital Signs: There were no vitals taken for this visit. General Exam:   Constitutional: Patient is adequately groomed with no evidence of malnutrition  DTRs: Deep tendon reflexes are intact  Mental Status: The patient is oriented to time, place and person. The patient's mood and affect are appropriate. Knee Examination:    Inspection: Surgical incision well-healed over the anterior aspect of the right knee    Palpation: No significant palpable effusion within the knee today    Range of Motion: Extension of the knee is 0 degrees of knee flexion today to 115 degrees    Strength: Able to do a straight leg raise    Special Tests: No instability to varus valgus stress testing. Negative posterior drawer    Skin: There are no rashes, ulcerations or lesions. Gait: Using a cane        Additional Comments:       Additional Examinations:         Left Lower Extremity: Examination of the left lower extremity does not show any tenderness, deformity or injury. Range of motion is unremarkable. There is no gross instability. There are no rashes, ulcerations or lesions.   Strength and tone are normal.    Radiology: Assessment : Status post right total knee arthroplasty    Impression:  Encounter Diagnoses   Name Primary?  Right knee pain, unspecified chronicity Yes    11/2/20 RIGHT TKA        Office Procedures:  No orders of the defined types were placed in this encounter. Treatment Plan: Overall doing well with the knee at this point time. Making excellent progress with physical therapy. She is going to continue with therapy 1-2 times a week for the remainder this year. I will see her back in clinic in 6 weeks for follow-up although she is doing very well the next plan time we would see her as 1 year postoperatively with repeat x-rays of the right knee.

## 2020-12-22 ENCOUNTER — HOSPITAL ENCOUNTER (OUTPATIENT)
Dept: PHYSICAL THERAPY | Age: 65
Setting detail: THERAPIES SERIES
Discharge: HOME | End: 2020-12-22
Payer: MEDICARE

## 2020-12-22 PROCEDURE — 97140 MANUAL THERAPY 1/> REGIONS: CPT | Performed by: SPECIALIST/TECHNOLOGIST

## 2020-12-22 PROCEDURE — 97110 THERAPEUTIC EXERCISES: CPT | Performed by: SPECIALIST/TECHNOLOGIST

## 2020-12-22 PROCEDURE — 97112 NEUROMUSCULAR REEDUCATION: CPT | Performed by: SPECIALIST/TECHNOLOGIST

## 2020-12-22 NOTE — FLOWSHEET NOTE
[facility] Orthopaedics and [address] [place] [address]; [facility]           Physical Therapy Treatment Note/ Progress Report:           Date:  2020  Patient Name:  Ling Hinton    :  1955  MRN: [id number]  Restrictions/Precautions:    Medical/Treatment Diagnosis Information:  · Diagnosis: M17.11 (ICD-10-CM) - Primary osteoarthritis of right knee  · Treatment Diagnosis: right knee pain - H54.456  Insurance/Certification information:     Physician Information:  Referring Practitioner: dr Deanna Mancini  Has the plan of care been signed (Y/N):        []  Yes  [x]  No     Date of Patient follow up with Physician:      Is this a Progress Report:     []  Yes  [x]  No        If Yes:  Date Range for reporting period:  Beginning  Ending    Progress report will be due (10 Rx or 30 days whichever is less):       Recertification will be due (POC Duration  / 90 days whichever is less):         Visit # Insurance Allowable Auth Required   5  []  Yes []  No        Functional Scale:    Date assessed:       Latex Allergy:  [x]NO      []YES  Preferred Language for Healthcare:   [x]English       []other:      Pain level:  4/10     SUBJECTIVE:  Pt notes that her knee is feeling ok today.       OBJECTIVE: See eval  ? Observation:   ? Test measurements:      RESTRICTIONS/PRECAUTIONS:   Exercises/Interventions:       Therapeutic Ex (37682) Sets/sec Reps Notes/CUES   Heel slides  10 7    Quad sets   30x     slr's (flex, abd)   30x each 1#   Saq/laq   30x    bridge  3x10    BS   10\" x 10    Calf raises 3 10    STD ham curls 3 10 0#   flexinator 5x1''  121 degrees   4\" step  X 20                 Manual Intervention (23350)      Prom/stm   15'     Scar massage  3'                                                                                                                                      Therapeutic Exercise and NMR EXR [x] (65758) Provided verbal/tactile cueing for activities related to strengthening, flexibility, endurance, ROM for improvements in LE, proximal hip, and core control with self care, mobility, lifting, ambulation. [x] (82644) Provided verbal/tactile cueing for activities related to improving balance, coordination, kinesthetic sense, posture, motor skill, proprioception to assist with LE, proximal hip, and core control in self-care, mobility, lifting, ambulation and eccentric single leg control.      NMR and Therapeutic Activities:    [x] (95125 or 86664) Provided verbal/tactile cueing for activities related to improving balance, coordination, kinesthetic sense, posture, motor skill, proprioception and motor activation to allow for proper function of core, proximal hip and LE with self-care and ADLs and functional mobility.   [] (24868) Gait Re-education- Provided training and instruction to the patient for proper LE, core and proximal hip recruitment and positioning and eccentric body weight control with ambulation re-education including up and down stairs     Home Exercise Program:    [x] (06000) Reviewed/Progressed HEP activities related to strengthening, flexibility, endurance, ROM of core, proximal hip and LE for functional self-care, mobility, lifting and ambulation/stair navigation   [] (81481) Reviewed/Progressed HEP activities related to improving balance, coordination, kinesthetic sense, posture, motor skill, proprioception of core, proximal hip and LE for self-care, mobility, lifting, and ambulation/stair navigation      Manual Treatments:  PROM / STM / Oscillations-Mobs:  G-I, II, III, IV (PA's, Inf., Post.) [x] (45774) Provided manual therapy to mobilize LE, proximal hip and/or LS spine soft tissue/joints for the purpose of modulating pain, promoting relaxation, increasing ROM, reducing/eliminating soft tissue swelling/inflammation/restriction, improving soft tissue extensibility and allowing for proper ROM for normal function with self-care, mobility, lifting and ambulation. Modalities:  CP x 10'   [] GAME READY (VASO)- for significant edema, swelling, pain control. Charges:  Timed Code Treatment Minutes: 55   Total Treatment Minutes: 65      [] EVAL (LOW) 46836 (typically 20 minutes face-to-face)  [] EVAL (MOD) 63094 (typically 30 minutes face-to-face)  [] EVAL (HIGH) 48924 (typically 45 minutes face-to-face)  [] RE-EVAL     [x] GC(12295) x 2    [] IONTO  [x] NMR (62718) x  1   [] VASO  [x] Manual (72867) x  1   [] Other:  [] TA x      [] Mech Traction (52396)  [] ES(attended) (02718)      [] ES (un) (98892):         ASSESSMENT:  Patient is very guarded with manual mobilization and PROM. Fatigued easily with quad and glut strengthening. Improved gait pattern with verbal cues. GOALS:   Patient stated goal:     [] Progressing: [] Met: [] Not Met: [] Adjusted    Therapist goals for Patient:   Short Term Goals: To be achieved in: 2 weeks  1. Independent in HEP and progression per patient tolerance, in order to prevent re-injury. [] Progressing: [] Met: [] Not Met: [] Adjusted   2. Patient will have a decrease in pain to facilitate improvement in movement, function, and ADLs as indicated by Functional Deficits. [] Progressing: [] Met: [] Not Met: [] Adjusted    Long Term Goals: To be achieved in:  Overall Progression Towards Functional goals/ Treatment Progress Update:  [] Patient is progressing as expected towards functional goals listed. [] Progression is slowed due to complexities/Impairments listed. [] Progression has been slowed due to co-morbidities. [x] Plan just implemented, too soon to assess goals progression <30days   [] Goals require adjustment due to lack of progress  [] Patient is not progressing as expected and requires additional follow up with physician  [] Other    Prognosis for POC: [x] Good [] Fair  [] Poor      Patient requires continued skilled intervention: [x] Yes  [] No    Treatment/Activity Tolerance:  [x] Patient able to complete treatment  [] Patient limited by fatigue  [] Patient limited by pain    [] Patient limited by other medical complications  [] Other:         Return to Play: (if applicable)   []  Stage 1: Intro to Strength   []  Stage 2: Return to Run and Strength   []  Stage 3: Return to Jump and Strength   []  Stage 4: Dynamic Strength and Agility   []  Stage 5: Sport Specific Training     []  Ready to Return to Play, Meets All Above Stages   []  Not Ready for Return to Sports   Comments:                               PLAN: See eval  [x] Continue per plan of care [] Alter current plan (see comments above)  [] Plan of care initiated [] Hold pending MD visit [] Discharge      Electronically signed by: Swetha Lopez PTA, ATC      Note: If patient does not return for scheduled/ recommended follow up visits, this note will serve as a discharge from care along with most recent update on progress.

## 2020-12-24 ENCOUNTER — HOSPITAL ENCOUNTER (OUTPATIENT)
Dept: PHYSICAL THERAPY | Age: 65
Setting detail: THERAPIES SERIES
Discharge: HOME | End: 2020-12-24
Payer: MEDICARE

## 2020-12-24 PROCEDURE — 97110 THERAPEUTIC EXERCISES: CPT | Performed by: SPECIALIST/TECHNOLOGIST

## 2020-12-24 PROCEDURE — 97112 NEUROMUSCULAR REEDUCATION: CPT | Performed by: SPECIALIST/TECHNOLOGIST

## 2020-12-24 PROCEDURE — 97140 MANUAL THERAPY 1/> REGIONS: CPT | Performed by: SPECIALIST/TECHNOLOGIST

## 2020-12-24 NOTE — FLOWSHEET NOTE
The [facility] [employer] Orthopaedics and [address] [place] [address]; [facility]           Physical Therapy Treatment Note/ Progress Report:           Date:  2020  Patient Name:  Opal Mora    :  1955  MRN: [id number]  Restrictions/Precautions:    Medical/Treatment Diagnosis Information:  · Diagnosis: M17.11 (ICD-10-CM) - Primary osteoarthritis of right knee  · Treatment Diagnosis: right knee pain - I52.633  Insurance/Certification information:     Physician Information:  Referring Practitioner: dr Linda Schwarz  Has the plan of care been signed (Y/N):        []  Yes  [x]  No     Date of Patient follow up with Physician:      Is this a Progress Report:     []  Yes  [x]  No        If Yes:  Date Range for reporting period:  Beginning  Ending    Progress report will be due (10 Rx or 30 days whichever is less):       Recertification will be due (POC Duration  / 90 days whichever is less):         Visit # Insurance Allowable Auth Required   6  []  Yes []  No        Functional Scale:    Date assessed:       Latex Allergy:  [x]NO      []YES  Preferred Language for Healthcare:   [x]English       []other:      Pain level:  4/10     SUBJECTIVE:  Pt notes that her knee is feeling ok today a little     OBJECTIVE: See eval  ? Observation:   ? Test measurements:      RESTRICTIONS/PRECAUTIONS:   Exercises/Interventions:       Therapeutic Ex (70739) Sets/sec Reps Notes/CUES   Heel slides  10 7    Quad sets   30x     slr's (flex, abd)   30x each 1#   Saq/laq   30x    bridge  3x10    BS   10\" x 10    Calf raises 3 10    STD ham curls 3 10 0#   flexinator 5x1''  124 degrees   4\" step  X 20                 Manual Intervention (62679)      Prom/stm   15'     Scar massage  3'                                                                                                                                      Therapeutic Exercise and NMR EXR [x] (78298) Provided verbal/tactile cueing for activities related to strengthening, flexibility, endurance, ROM for improvements in LE, proximal hip, and core control with self care, mobility, lifting, ambulation. [x] (31559) Provided verbal/tactile cueing for activities related to improving balance, coordination, kinesthetic sense, posture, motor skill, proprioception to assist with LE, proximal hip, and core control in self-care, mobility, lifting, ambulation and eccentric single leg control.      NMR and Therapeutic Activities:    [x] (06592 or 69314) Provided verbal/tactile cueing for activities related to improving balance, coordination, kinesthetic sense, posture, motor skill, proprioception and motor activation to allow for proper function of core, proximal hip and LE with self-care and ADLs and functional mobility.   [] (23672) Gait Re-education- Provided training and instruction to the patient for proper LE, core and proximal hip recruitment and positioning and eccentric body weight control with ambulation re-education including up and down stairs     Home Exercise Program:    [x] (95530) Reviewed/Progressed HEP activities related to strengthening, flexibility, endurance, ROM of core, proximal hip and LE for functional self-care, mobility, lifting and ambulation/stair navigation   [] (49609) Reviewed/Progressed HEP activities related to improving balance, coordination, kinesthetic sense, posture, motor skill, proprioception of core, proximal hip and LE for self-care, mobility, lifting, and ambulation/stair navigation      Manual Treatments:  PROM / STM / Oscillations-Mobs:  G-I, II, III, IV (PA's, Inf., Post.) [x] (69800) Provided manual therapy to mobilize LE, proximal hip and/or LS spine soft tissue/joints for the purpose of modulating pain, promoting relaxation, increasing ROM, reducing/eliminating soft tissue swelling/inflammation/restriction, improving soft tissue extensibility and allowing for proper ROM for normal function with self-care, mobility, lifting and ambulation. Modalities:  CP x 10'   [] GAME READY (VASO)- for significant edema, swelling, pain control. Charges:  Timed Code Treatment Minutes: 55   Total Treatment Minutes: 65      [] EVAL (LOW) 17121 (typically 20 minutes face-to-face)  [] EVAL (MOD) 96294 (typically 30 minutes face-to-face)  [] EVAL (HIGH) 42994 (typically 45 minutes face-to-face)  [] RE-EVAL     [x] FW(88579) x 2    [] IONTO  [x] NMR (21672) x  1   [] VASO  [x] Manual (93006) x  1   [] Other:  [] TA x      [] Mech Traction (57586)  [] ES(attended) (07701)      [] ES (un) (11931):         ASSESSMENT:  Patient is very guarded with manual mobilization and PROM. Fatigued easily with quad and glut strengthening. Improved gait pattern with verbal cues. GOALS:   Patient stated goal:     [] Progressing: [] Met: [] Not Met: [] Adjusted    Therapist goals for Patient:   Short Term Goals: To be achieved in: 2 weeks  1. Independent in HEP and progression per patient tolerance, in order to prevent re-injury. [] Progressing: [] Met: [] Not Met: [] Adjusted   2. Patient will have a decrease in pain to facilitate improvement in movement, function, and ADLs as indicated by Functional Deficits. [] Progressing: [] Met: [] Not Met: [] Adjusted    Long Term Goals: To be achieved in:  Overall Progression Towards Functional goals/ Treatment Progress Update:  [] Patient is progressing as expected towards functional goals listed. [] Progression is slowed due to complexities/Impairments listed. [] Progression has been slowed due to co-morbidities. [x] Plan just implemented, too soon to assess goals progression <30days   [] Goals require adjustment due to lack of progress  [] Patient is not progressing as expected and requires additional follow up with physician  [] Other    Prognosis for POC: [x] Good [] Fair  [] Poor      Patient requires continued skilled intervention: [x] Yes  [] No    Treatment/Activity Tolerance:  [x] Patient able to complete treatment  [] Patient limited by fatigue  [] Patient limited by pain    [] Patient limited by other medical complications  [] Other:         Return to Play: (if applicable)   []  Stage 1: Intro to Strength   []  Stage 2: Return to Run and Strength   []  Stage 3: Return to Jump and Strength   []  Stage 4: Dynamic Strength and Agility   []  Stage 5: Sport Specific Training     []  Ready to Return to Play, Meets All Above Stages   []  Not Ready for Return to Sports   Comments:                               PLAN: See eval  [x] Continue per plan of care [] Alter current plan (see comments above)  [] Plan of care initiated [] Hold pending MD visit [] Discharge      Electronically signed by: Peña Pedro PTA, ATC      Note: If patient does not return for scheduled/ recommended follow up visits, this note will serve as a discharge from care along with most recent update on progress.

## 2020-12-28 ENCOUNTER — APPOINTMENT (OUTPATIENT)
Dept: PHYSICAL THERAPY | Age: 65
End: 2020-12-28
Payer: MEDICARE

## 2020-12-29 ENCOUNTER — HOSPITAL ENCOUNTER (OUTPATIENT)
Dept: PHYSICAL THERAPY | Age: 65
Setting detail: THERAPIES SERIES
Discharge: HOME | End: 2020-12-29
Payer: MEDICARE

## 2020-12-29 PROCEDURE — 97110 THERAPEUTIC EXERCISES: CPT | Performed by: SPECIALIST/TECHNOLOGIST

## 2020-12-29 PROCEDURE — 97112 NEUROMUSCULAR REEDUCATION: CPT | Performed by: SPECIALIST/TECHNOLOGIST

## 2020-12-29 PROCEDURE — 97140 MANUAL THERAPY 1/> REGIONS: CPT | Performed by: SPECIALIST/TECHNOLOGIST

## 2020-12-29 NOTE — FLOWSHEET NOTE
[x] (10075) Provided manual therapy to mobilize LE, proximal hip and/or LS spine soft tissue/joints for the purpose of modulating pain, promoting relaxation, increasing ROM, reducing/eliminating soft tissue swelling/inflammation/restriction, improving soft tissue extensibility and allowing for proper ROM for normal function with self-care, mobility, lifting and ambulation. Modalities:  CP x 10'   [] GAME READY (VASO)- for significant edema, swelling, pain control. Charges:  Timed Code Treatment Minutes: 55   Total Treatment Minutes: 65      [] EVAL (LOW) 56681 (typically 20 minutes face-to-face)  [] EVAL (MOD) 59446 (typically 30 minutes face-to-face)  [] EVAL (HIGH) 56325 (typically 45 minutes face-to-face)  [] RE-EVAL     [x] AW(61782) x 2    [] IONTO  [x] NMR (54969) x  1   [] VASO  [x] Manual (21437) x  1   [] Other:  [] TA x      [] Mech Traction (40678)  [] ES(attended) (71212)      [] ES (un) (90731):         ASSESSMENT:  Patient is very guarded with manual mobilization and PROM. Fatigued easily with quad and glut strengthening. Improved gait pattern with verbal cues. GOALS:   Patient stated goal:     [] Progressing: [] Met: [] Not Met: [] Adjusted    Therapist goals for Patient:   Short Term Goals: To be achieved in: 2 weeks  1. Independent in HEP and progression per patient tolerance, in order to prevent re-injury. [] Progressing: [] Met: [] Not Met: [] Adjusted   2. Patient will have a decrease in pain to facilitate improvement in movement, function, and ADLs as indicated by Functional Deficits. [] Progressing: [] Met: [] Not Met: [] Adjusted    Long Term Goals: To be achieved in:  Overall Progression Towards Functional goals/ Treatment Progress Update:  [] Patient is progressing as expected towards functional goals listed. [] Progression is slowed due to complexities/Impairments listed. [] Progression has been slowed due to co-morbidities. [x] Plan just implemented, too soon to assess goals progression <30days   [] Goals require adjustment due to lack of progress  [] Patient is not progressing as expected and requires additional follow up with physician  [] Other    Prognosis for POC: [x] Good [] Fair  [] Poor      Patient requires continued skilled intervention: [x] Yes  [] No    Treatment/Activity Tolerance:  [x] Patient able to complete treatment  [] Patient limited by fatigue  [] Patient limited by pain    [] Patient limited by other medical complications  [] Other:         Return to Play: (if applicable)   []  Stage 1: Intro to Strength   []  Stage 2: Return to Run and Strength   []  Stage 3: Return to Jump and Strength   []  Stage 4: Dynamic Strength and Agility   []  Stage 5: Sport Specific Training     []  Ready to Return to Play, Meets All Above Stages   []  Not Ready for Return to Sports   Comments:                               PLAN: See eval  [x] Continue per plan of care [] Alter current plan (see comments above)  [] Plan of care initiated [] Hold pending MD visit [] Discharge      Electronically signed by: Héctor Leonardo PTA, ATC      Note: If patient does not return for scheduled/ recommended follow up visits, this note will serve as a discharge from care along with most recent update on progress.

## 2020-12-30 ENCOUNTER — APPOINTMENT (OUTPATIENT)
Dept: PHYSICAL THERAPY | Age: 65
End: 2020-12-30
Payer: MEDICARE

## 2020-12-31 ENCOUNTER — HOSPITAL ENCOUNTER (OUTPATIENT)
Dept: PHYSICAL THERAPY | Age: 65
Setting detail: THERAPIES SERIES
Discharge: HOME | End: 2020-12-31
Payer: MEDICARE

## 2020-12-31 PROCEDURE — 97112 NEUROMUSCULAR REEDUCATION: CPT | Performed by: SPECIALIST/TECHNOLOGIST

## 2020-12-31 PROCEDURE — 97140 MANUAL THERAPY 1/> REGIONS: CPT | Performed by: SPECIALIST/TECHNOLOGIST

## 2020-12-31 PROCEDURE — 97110 THERAPEUTIC EXERCISES: CPT | Performed by: SPECIALIST/TECHNOLOGIST

## 2020-12-31 NOTE — FLOWSHEET NOTE
Therapeutic Exercise and NMR EXR  [x] (32153) Provided verbal/tactile cueing for activities related to strengthening, flexibility, endurance, ROM for improvements in LE, proximal hip, and core control with self care, mobility, lifting, ambulation. [x] (81099) Provided verbal/tactile cueing for activities related to improving balance, coordination, kinesthetic sense, posture, motor skill, proprioception to assist with LE, proximal hip, and core control in self-care, mobility, lifting, ambulation and eccentric single leg control.      NMR and Therapeutic Activities:    [x] (21853 or 30658) Provided verbal/tactile cueing for activities related to improving balance, coordination, kinesthetic sense, posture, motor skill, proprioception and motor activation to allow for proper function of core, proximal hip and LE with self-care and ADLs and functional mobility.   [] (78535) Gait Re-education- Provided training and instruction to the patient for proper LE, core and proximal hip recruitment and positioning and eccentric body weight control with ambulation re-education including up and down stairs     Home Exercise Program:    [x] (73061) Reviewed/Progressed HEP activities related to strengthening, flexibility, endurance, ROM of core, proximal hip and LE for functional self-care, mobility, lifting and ambulation/stair navigation   [] (12704) Reviewed/Progressed HEP activities related to improving balance, coordination, kinesthetic sense, posture, motor skill, proprioception of core, proximal hip and LE for self-care, mobility, lifting, and ambulation/stair navigation      Manual Treatments:  PROM / STM / Oscillations-Mobs:  G-I, II, III, IV (PA's, Inf., Post.) [x] (53551) Provided manual therapy to mobilize LE, proximal hip and/or LS spine soft tissue/joints for the purpose of modulating pain, promoting relaxation, increasing ROM, reducing/eliminating soft tissue swelling/inflammation/restriction, improving soft tissue extensibility and allowing for proper ROM for normal function with self-care, mobility, lifting and ambulation. Modalities:  CP x 10'   [] GAME READY (VASO)- for significant edema, swelling, pain control. Charges:  Timed Code Treatment Minutes: 38   Total Treatment Minutes: 48      [] EVAL (LOW) 65962 (typically 20 minutes face-to-face)   [] EVAL (MOD) 38267 (typically 30 minutes face-to-face)  [] EVAL (HIGH) 68416 (typically 45 minutes face-to-face)  [] RE-EVAL     [x] (65664) x 1  [] IONTO  [x] NMR (53964) x  1   [] VASO  [x] Manual (52857) x  1   [] Other:  [] TA x      [] Mech Traction (55102)  [] ES(attended) (36799)      [] ES (un) (92986):         ASSESSMENT:  Patient is very guarded with manual mobilization and PROM. Fatigued easily with quad and glut strengthening. Improved gait pattern with verbal cues. GOALS:   Patient stated goal:     [] Progressing: [] Met: [] Not Met: [] Adjusted    Therapist goals for Patient:   Short Term Goals: To be achieved in: 2 weeks  1. Independent in HEP and progression per patient tolerance, in order to prevent re-injury. [] Progressing: [] Met: [] Not Met: [] Adjusted   2. Patient will have a decrease in pain to facilitate improvement in movement, function, and ADLs as indicated by Functional Deficits. [] Progressing: [] Met: [] Not Met: [] Adjusted    Long Term Goals: To be achieved in:  Overall Progression Towards Functional goals/ Treatment Progress Update:  [] Patient is progressing as expected towards functional goals listed. [] Progression is slowed due to complexities/Impairments listed. [] Progression has been slowed due to co-morbidities. [x] Plan just implemented, too soon to assess goals progression <30days   [] Goals require adjustment due to lack of progress  [] Patient is not progressing as expected and requires additional follow up with physician  [] Other    Prognosis for POC: [x] Good [] Fair  [] Poor      Patient requires continued skilled intervention: [x] Yes  [] No    Treatment/Activity Tolerance:  [x] Patient able to complete treatment  [] Patient limited by fatigue  [] Patient limited by pain    [] Patient limited by other medical complications  [] Other:         Return to Play: (if applicable)   []  Stage 1: Intro to Strength   []  Stage 2: Return to Run and Strength   []  Stage 3: Return to Jump and Strength   []  Stage 4: Dynamic Strength and Agility   []  Stage 5: Sport Specific Training     []  Ready to Return to Play, Meets All Above Stages   []  Not Ready for Return to Sports   Comments:                               PLAN: See eval  [x] Continue per plan of care [] Alter current plan (see comments above)  [] Plan of care initiated [] Hold pending MD visit [] Discharge      Electronically signed by: Bandar Holly PTA, ATC      Note: If patient does not return for scheduled/ recommended follow up visits, this note will serve as a discharge from care along with most recent update on progress.

## 2021-01-07 ENCOUNTER — HOSPITAL ENCOUNTER (OUTPATIENT)
Dept: PHYSICAL THERAPY | Age: 66
Setting detail: THERAPIES SERIES
Discharge: HOME | End: 2021-01-07
Payer: MEDICARE

## 2021-01-07 PROCEDURE — 97110 THERAPEUTIC EXERCISES: CPT | Performed by: SPECIALIST/TECHNOLOGIST

## 2021-01-07 PROCEDURE — 97140 MANUAL THERAPY 1/> REGIONS: CPT | Performed by: SPECIALIST/TECHNOLOGIST

## 2021-01-07 PROCEDURE — 97112 NEUROMUSCULAR REEDUCATION: CPT | Performed by: SPECIALIST/TECHNOLOGIST

## 2021-01-07 NOTE — FLOWSHEET NOTE
[facility] [employer] Orthopaedics and Sports Rehabilitation; [employer]           Physical Therapy Treatment Note/ Progress Report:           Date:  2020  Patient Name:  Sharifa Cervantes    :  1955  MRN: [id number]  Restrictions/Precautions:    Medical/Treatment Diagnosis Information:  · Diagnosis: M17.11 (ICD-10-CM) - Primary osteoarthritis of right knee  · Treatment Diagnosis: right knee pain - Y35.865  Insurance/Certification information:     Physician Information:  Referring Practitioner: dr Anisha Dodson  Has the plan of care been signed (Y/N):        []  Yes  [x]  No     Date of Patient follow up with Physician:      Is this a Progress Report:     []  Yes  [x]  No        If Yes:  Date Range for reporting period:  Beginning  Ending    Progress report will be due (10 Rx or 30 days whichever is less):       Recertification will be due (POC Duration  / 90 days whichever is less):         Visit # Insurance Allowable Auth Required   9  []  Yes []  No        Functional Scale:    Date assessed:       Latex Allergy:  [x]NO      []YES  Preferred Language for Healthcare:   [x]English       []other:      Pain level:  4/10     SUBJECTIVE:  Pt notes that her knee      OBJECTIVE: See eval   Observation:    Test measurements:      RESTRICTIONS/PRECAUTIONS:   Exercises/Interventions:       Therapeutic Ex (00660) Sets/sec Reps Notes/CUES   Heel slides  10 7    Quad sets   30x     Calf raises/ calf stretch 3x10/30\" x3     Table slide X 5  128 degrees on table   8\" step  X 30     Leg Press X 30 80#    Hamstring curls  Knee ext X 30  X 30 35#  20#    Manual Intervention ([id number])      Prom/stm   15'     Scar massage  3'    [facility]    TKE R/L x 30 ea  X 25 c 3\" hold 30#    60#     Bike  7'                       Tandem stance  R/L   10\" x 2 ea                                                                                                 Therapeutic Exercise and NMR EXR  [x] (08989) Provided verbal/tactile cueing for activities related to strengthening, flexibility, endurance, ROM for improvements in LE, proximal hip, and core control with self care, mobility, lifting, ambulation. [x] (44171) Provided verbal/tactile cueing for activities related to improving balance, coordination, kinesthetic sense, posture, motor skill, proprioception to assist with LE, proximal hip, and core control in self-care, mobility, lifting, ambulation and eccentric single leg control.      NMR and Therapeutic Activities:    [x] (11105 or 55247) Provided verbal/tactile cueing for activities related to improving balance, coordination, kinesthetic sense, posture, motor skill, proprioception and motor activation to allow for proper function of core, proximal hip and LE with self-care and ADLs and functional mobility.   [] (20299) Gait Re-education- Provided training and instruction to the patient for proper LE, core and proximal hip recruitment and positioning and eccentric body weight control with ambulation re-education including up and down stairs     Home Exercise Program:    [x] (36480) Reviewed/Progressed HEP activities related to strengthening, flexibility, endurance, ROM of core, proximal hip and LE for functional self-care, mobility, lifting and ambulation/stair navigation   [] (18707) Reviewed/Progressed HEP activities related to improving balance, coordination, kinesthetic sense, posture, motor skill, proprioception of core, proximal hip and LE for self-care, mobility, lifting, and ambulation/stair navigation      Manual Treatments:  PROM / STM / Oscillations-Mobs:  G-I, II, III, IV (PA's, Inf., Post.)  [x] (35040) Provided manual therapy to mobilize LE, proximal hip and/or LS spine soft tissue/joints for the purpose of modulating pain, promoting relaxation, increasing ROM, reducing/eliminating soft tissue swelling/inflammation/restriction, improving soft tissue extensibility and allowing for proper ROM for normal function with self-care, mobility, lifting and ambulation. Modalities:  CP x 15'   [] GAME READY (VASO)- for significant edema, swelling, pain control. Charges:  Timed Code Treatment Minutes: 45   Total Treatment Minutes: 60      [] EVAL (LOW) 09226 (typically 20 minutes face-to-face)   [] EVAL (MOD) 75474 (typically 30 minutes face-to-face)  [] EVAL (HIGH) 22278 (typically 45 minutes face-to-face)  [] RE-EVAL     [x] LY(98462) x 1  [] IONTO  [x] NMR (41373) x  1   [] VASO  [x] Manual (02226) x  1   [] Other:  [] TA x      [] Mech Traction (49675)  [] ES(attended) (71203)      [] ES (un) (89661):         ASSESSMENT:  Patient is very guarded with manual mobilization and PROM. Fatigued easily with quad and glut strengthening. Improved gait pattern with verbal cues. GOALS:   Patient stated goal:     [] Progressing: [] Met: [] Not Met: [] Adjusted    Therapist goals for Patient:   Short Term Goals: To be achieved in: 2 weeks  1. Independent in HEP and progression per patient tolerance, in order to prevent re-injury. [] Progressing: [] Met: [] Not Met: [] Adjusted   2. Patient will have a decrease in pain to facilitate improvement in movement, function, and ADLs as indicated by Functional Deficits. [] Progressing: [] Met: [] Not Met: [] Adjusted    Long Term Goals: To be achieved in:  Overall Progression Towards Functional goals/ Treatment Progress Update:  [] Patient is progressing as expected towards functional goals listed. [] Progression is slowed due to complexities/Impairments listed. [] Progression has been slowed due to co-morbidities.   [x] Plan just implemented, too soon to assess goals progression <30days   [] Goals require adjustment due to lack of progress  [] Patient is not progressing as expected and requires additional follow up with physician  [] Other    Prognosis for POC: [x] Good [] Fair  [] Poor      Patient requires continued skilled intervention: [x] Yes  [] No    Treatment/Activity Tolerance:  [x] Patient able to complete treatment  [] Patient limited by fatigue  [] Patient limited by pain    [] Patient limited by other medical complications  [] Other:         Return to Play: (if applicable)   []  Stage 1: Intro to Strength   []  Stage 2: Return to Run and Strength   []  Stage 3: Return to Jump and Strength   []  Stage 4: Dynamic Strength and Agility   []  Stage 5: Sport Specific Training     []  Ready to Return to Play, Meets All Above Stages   []  Not Ready for Return to Sports   Comments:                               PLAN: See eval  [x] Continue per plan of care [] Alter current plan (see comments above)  [] Plan of care initiated [] Hold pending MD visit [] Discharge      Electronically signed by: Bethanie Munoz PTA, ATC      Note: If patient does not return for scheduled/ recommended follow up visits, this note will serve as a discharge from care along with most recent update on progress.

## 2021-01-14 ENCOUNTER — HOSPITAL ENCOUNTER (OUTPATIENT)
Dept: PHYSICAL THERAPY | Age: 66
Setting detail: THERAPIES SERIES
Discharge: HOME | End: 2021-01-14
Payer: MEDICARE

## 2021-01-14 PROCEDURE — 97112 NEUROMUSCULAR REEDUCATION: CPT | Performed by: SPECIALIST/TECHNOLOGIST

## 2021-01-14 PROCEDURE — 97140 MANUAL THERAPY 1/> REGIONS: CPT | Performed by: SPECIALIST/TECHNOLOGIST

## 2021-01-14 PROCEDURE — 97110 THERAPEUTIC EXERCISES: CPT | Performed by: SPECIALIST/TECHNOLOGIST

## 2021-01-14 NOTE — FLOWSHEET NOTE
[facility] Orthopaedics and [address] [place] [address]; [facility]           Physical Therapy Treatment Note/ Progress Report:           Date:  2020  Patient Name:  Orion Bingham    :  1955  MRN: [id number]  Restrictions/Precautions:    Medical/Treatment Diagnosis Information:  · Diagnosis: M17.11 (ICD-10-CM) - Primary osteoarthritis of right knee  · Treatment Diagnosis: right knee pain - C30.201  Insurance/Certification information:     Physician Information:  Referring Practitioner: dr Navarro Berg  Has the plan of care been signed (Y/N):        []  Yes  [x]  No     Date of Patient follow up with Physician:      Is this a Progress Report:     []  Yes  [x]  No        If Yes:  Date Range for reporting period:  Beginning  Ending    Progress report will be due (10 Rx or 30 days whichever is less):       Recertification will be due (POC Duration  / 90 days whichever is less):         Visit # Insurance Allowable Auth Required   10  []  Yes []  No        Functional Scale:    Date assessed:       Latex Allergy:  [x]NO      []YES  Preferred Language for Healthcare:   [x]English       []other:      Pain level:  4/10     SUBJECTIVE:  Pt notes that her knee is feeling much better. Notes she can do a lot more now than she could compared to her first visit.       OBJECTIVE: See eval  ? Observation:   ? Test measurements:      RESTRICTIONS/PRECAUTIONS:   Exercises/Interventions:       Therapeutic Ex (80474) Sets/sec Reps Notes/CUES   Heel slides  10 7    Quad sets   30x     Calf raises/ calf stretch 3x10/30\" x3     Table slide X 5  128 degrees on table   8\" step  X 30     Leg Press X 30 80#    Hamstring curls  Knee ext X 30  X 30 35#  20#    Manual Intervention (57793)      Prom/stm   15'     Scar massage  3'    [address] ABD    TKE R/L x 30 ea  X 25 c 3\" hold 30#    60#     Bike  7' lvl 2                      Tandem stance  R/L   10\" x 2 ea Therapeutic Exercise and NMR EXR  [x] (99284) Provided verbal/tactile cueing for activities related to strengthening, flexibility, endurance, ROM for improvements in LE, proximal hip, and core control with self care, mobility, lifting, ambulation. [x] (68282) Provided verbal/tactile cueing for activities related to improving balance, coordination, kinesthetic sense, posture, motor skill, proprioception to assist with LE, proximal hip, and core control in self-care, mobility, lifting, ambulation and eccentric single leg control.      NMR and Therapeutic Activities:    [x] (77957 or 82994) Provided verbal/tactile cueing for activities related to improving balance, coordination, kinesthetic sense, posture, motor skill, proprioception and motor activation to allow for proper function of core, proximal hip and LE with self-care and ADLs and functional mobility.   [] (43777) Gait Re-education- Provided training and instruction to the patient for proper LE, core and proximal hip recruitment and positioning and eccentric body weight control with ambulation re-education including up and down stairs     Home Exercise Program:    [x] (41011) Reviewed/Progressed HEP activities related to strengthening, flexibility, endurance, ROM of core, proximal hip and LE for functional self-care, mobility, lifting and ambulation/stair navigation   [] (79487) Reviewed/Progressed HEP activities related to improving balance, coordination, kinesthetic sense, posture, motor skill, proprioception of core, proximal hip and LE for self-care, mobility, lifting, and ambulation/stair navigation      Manual Treatments:  PROM / STM / Oscillations-Mobs:  G-I, II, III, IV (PA's, Inf., Post.) [x] (79968) Provided manual therapy to mobilize LE, proximal hip and/or LS spine soft tissue/joints for the purpose of modulating pain, promoting relaxation, increasing ROM, reducing/eliminating soft tissue swelling/inflammation/restriction, improving soft tissue extensibility and allowing for proper ROM for normal function with self-care, mobility, lifting and ambulation. Modalities:  CP x 15'   [] GAME READY (VASO)- for significant edema, swelling, pain control. Charges:  Timed Code Treatment Minutes: 45   Total Treatment Minutes: 60      [] EVAL (LOW) 66412 (typically 20 minutes face-to-face)   [] EVAL (MOD) 16823 (typically 30 minutes face-to-face)  [] EVAL (HIGH) 01358 (typically 45 minutes face-to-face)  [] RE-EVAL     [x] GK(02362) x 1  [] IONTO  [x] NMR (04570) x  1   [] VASO  [x] Manual (15828) x  1   [] Other:  [] TA x      [] Mech Traction (77431)  [] ES(attended) (16476)      [] ES (un) (64898):    LEFS: 29% (1/14/21)    ASSESSMENT:  Patient is very guarded with manual mobilization and PROM. Fatigued easily with quad and glut strengthening. Improved gait pattern with verbal cues. GOALS:   Patient stated goal:     [] Progressing: [x] Met: [] Not Met: [] Adjusted    Therapist goals for Patient:   Short Term Goals: To be achieved in: 2 weeks  1. Independent in HEP and progression per patient tolerance, in order to prevent re-injury. [] Progressing: [x] Met: [] Not Met: [] Adjusted   2. Patient will have a decrease in pain to facilitate improvement in movement, function, and ADLs as indicated by Functional Deficits. [] Progressing: [x] Met: [] Not Met: [] Adjusted    Long Term Goals: To be achieved in:  Overall Progression Towards Functional goals/ Treatment Progress Update:  [x] Patient is progressing as expected towards functional goals listed. [] Progression is slowed due to complexities/Impairments listed. [] Progression has been slowed due to co-morbidities. [] Plan just implemented, too soon to assess goals progression <30days   [] Goals require adjustment due to lack of progress  [] Patient is not progressing as expected and requires additional follow up with physician  [] Other    Prognosis for POC: [x] Good [] Fair  [] Poor      Patient requires continued skilled intervention: [x] Yes  [] No    Treatment/Activity Tolerance:  [x] Patient able to complete treatment  [] Patient limited by fatigue  [] Patient limited by pain    [] Patient limited by other medical complications  [] Other:         Return to Play: (if applicable)   []  Stage 1: Intro to Strength   []  Stage 2: Return to Run and Strength   []  Stage 3: Return to Jump and Strength   []  Stage 4: Dynamic Strength and Agility   []  Stage 5: Sport Specific Training     []  Ready to Return to Play, Meets All Above Stages   []  Not Ready for Return to Sports   Comments:                               PLAN: See eval   [] Continue per plan of care [] Alter current plan (see comments above)  [] Plan of care initiated [] Hold pending MD visit [x] Discharge      Electronically signed by: Dede Garner PTA, ATC      Note: If patient does not return for scheduled/ recommended follow up visits, this note will serve as a discharge from care along with most recent update on progress.

## 2021-05-06 ENCOUNTER — OFFICE VISIT (OUTPATIENT)
Dept: FAMILY MEDICINE CLINIC | Age: 66
End: 2021-05-06
Payer: MEDICARE

## 2021-05-06 VITALS
OXYGEN SATURATION: 99 % | DIASTOLIC BLOOD PRESSURE: 94 MMHG | RESPIRATION RATE: 12 BRPM | HEART RATE: 81 BPM | SYSTOLIC BLOOD PRESSURE: 152 MMHG | TEMPERATURE: 97.1 F | WEIGHT: 186.6 LBS | BODY MASS INDEX: 36.44 KG/M2

## 2021-05-06 DIAGNOSIS — R03.0 ELEVATED BLOOD PRESSURE READING: ICD-10-CM

## 2021-05-06 DIAGNOSIS — N20.1 URETERAL CALCULI: ICD-10-CM

## 2021-05-06 DIAGNOSIS — H69.83 ETD (EUSTACHIAN TUBE DYSFUNCTION), BILATERAL: Primary | ICD-10-CM

## 2021-05-06 DIAGNOSIS — R42 DIZZINESS: ICD-10-CM

## 2021-05-06 PROCEDURE — 4040F PNEUMOC VAC/ADMIN/RCVD: CPT | Performed by: FAMILY MEDICINE

## 2021-05-06 PROCEDURE — 1123F ACP DISCUSS/DSCN MKR DOCD: CPT | Performed by: FAMILY MEDICINE

## 2021-05-06 PROCEDURE — 3017F COLORECTAL CA SCREEN DOC REV: CPT | Performed by: FAMILY MEDICINE

## 2021-05-06 PROCEDURE — G8400 PT W/DXA NO RESULTS DOC: HCPCS | Performed by: FAMILY MEDICINE

## 2021-05-06 PROCEDURE — 99214 OFFICE O/P EST MOD 30 MIN: CPT | Performed by: FAMILY MEDICINE

## 2021-05-06 PROCEDURE — 1090F PRES/ABSN URINE INCON ASSESS: CPT | Performed by: FAMILY MEDICINE

## 2021-05-06 PROCEDURE — G8427 DOCREV CUR MEDS BY ELIG CLIN: HCPCS | Performed by: FAMILY MEDICINE

## 2021-05-06 PROCEDURE — G8417 CALC BMI ABV UP PARAM F/U: HCPCS | Performed by: FAMILY MEDICINE

## 2021-05-06 PROCEDURE — 1036F TOBACCO NON-USER: CPT | Performed by: FAMILY MEDICINE

## 2021-05-06 RX ORDER — METHYLPREDNISOLONE 4 MG/1
TABLET ORAL
Qty: 21 TABLET | Refills: 0 | Status: SHIPPED | OUTPATIENT
Start: 2021-05-06 | End: 2021-10-11 | Stop reason: ALTCHOICE

## 2021-05-06 SDOH — ECONOMIC STABILITY: FOOD INSECURITY: WITHIN THE PAST 12 MONTHS, YOU WORRIED THAT YOUR FOOD WOULD RUN OUT BEFORE YOU GOT MONEY TO BUY MORE.: NEVER TRUE

## 2021-05-06 SDOH — ECONOMIC STABILITY: TRANSPORTATION INSECURITY
IN THE PAST 12 MONTHS, HAS THE LACK OF TRANSPORTATION KEPT YOU FROM MEDICAL APPOINTMENTS OR FROM GETTING MEDICATIONS?: NO

## 2021-05-06 ASSESSMENT — PATIENT HEALTH QUESTIONNAIRE - PHQ9
2. FEELING DOWN, DEPRESSED OR HOPELESS: 0
SUM OF ALL RESPONSES TO PHQ9 QUESTIONS 1 & 2: 0
SUM OF ALL RESPONSES TO PHQ QUESTIONS 1-9: 0
SUM OF ALL RESPONSES TO PHQ QUESTIONS 1-9: 0

## 2021-05-06 NOTE — PROGRESS NOTES
sooner for worsening symptomatology     2. Elevated blood pressure reading  Recheck still high  Prior blood pressure reviewed, has been a little high in the past but recheck better  Has f/u with nephrology, will have rechecked at that time and call with update of blood pressure readings    3. Ureteral calculi--calcium oxalate; left ureteral  Cont f/u with nephrology    4. Dizziness  As above           Follow-up appointment:   Call or return to clinic prn if these symptoms worsen or fail to improve as anticipated. Discussed use, benefit, and side effects of all prescribed medications. Barriers to medication compliance addressed. All patient questions answered. Pt voiced understanding. When applicable, patient's outside records were reviewed through [facility]. The patient has signed appropriate paperworks/consents.

## 2021-05-20 LAB
ALBUMIN SERPL-MCNC: 4.5 G/DL (ref 3.4–5)
ANION GAP SERPL CALCULATED.3IONS-SCNC: 11 MMOL/L (ref 3–16)
BUN BLDV-MCNC: 13 MG/DL (ref 7–20)
CALCIUM SERPL-MCNC: 10 MG/DL (ref 8.3–10.6)
CHLORIDE BLD-SCNC: 106 MMOL/L (ref 99–110)
CO2: 26 MMOL/L (ref 21–32)
CREAT SERPL-MCNC: 0.5 MG/DL (ref 0.6–1.2)
GFR AFRICAN AMERICAN: >60
GFR NON-AFRICAN AMERICAN: >60
GLUCOSE BLD-MCNC: 98 MG/DL (ref 70–99)
PHOSPHORUS: 3.2 MG/DL (ref 2.5–4.9)
POTASSIUM SERPL-SCNC: 4.1 MMOL/L (ref 3.5–5.1)
SODIUM BLD-SCNC: 143 MMOL/L (ref 136–145)

## 2021-07-05 DIAGNOSIS — E03.9 HYPOTHYROIDISM, UNSPECIFIED TYPE: ICD-10-CM

## 2021-07-06 RX ORDER — LEVOTHYROXINE SODIUM 88 UG/1
88 TABLET ORAL DAILY
Qty: 90 TABLET | Refills: 1 | Status: SHIPPED | OUTPATIENT
Start: 2021-07-06 | End: 2021-10-28 | Stop reason: SDUPTHER

## 2021-09-09 ENCOUNTER — TELEPHONE (OUTPATIENT)
Dept: FAMILY MEDICINE CLINIC | Age: 66
End: 2021-09-09

## 2021-09-09 NOTE — TELEPHONE ENCOUNTER
Called the pt to schedule her AWV, I left a voicemail to call back and schedule AWV. Will try again later.

## 2021-10-11 ENCOUNTER — OFFICE VISIT (OUTPATIENT)
Dept: FAMILY MEDICINE CLINIC | Age: 66
End: 2021-10-11
Payer: MEDICARE

## 2021-10-11 VITALS
BODY MASS INDEX: 36.08 KG/M2 | WEIGHT: 183.8 LBS | TEMPERATURE: 97 F | RESPIRATION RATE: 12 BRPM | HEIGHT: 60 IN | OXYGEN SATURATION: 98 % | SYSTOLIC BLOOD PRESSURE: 128 MMHG | HEART RATE: 76 BPM | DIASTOLIC BLOOD PRESSURE: 72 MMHG

## 2021-10-11 DIAGNOSIS — E03.9 HYPOTHYROIDISM, UNSPECIFIED TYPE: ICD-10-CM

## 2021-10-11 DIAGNOSIS — Z91.89 10 YEAR RISK OF MI OR STROKE < 7.5%: ICD-10-CM

## 2021-10-11 DIAGNOSIS — Z00.00 ROUTINE GENERAL MEDICAL EXAMINATION AT A HEALTH CARE FACILITY: Primary | ICD-10-CM

## 2021-10-11 DIAGNOSIS — Z23 NEED FOR PNEUMOCOCCAL VACCINATION: ICD-10-CM

## 2021-10-11 DIAGNOSIS — Z12.31 ENCOUNTER FOR SCREENING MAMMOGRAM FOR MALIGNANT NEOPLASM OF BREAST: ICD-10-CM

## 2021-10-11 DIAGNOSIS — R74.8 ALKALINE PHOSPHATASE RAISED: ICD-10-CM

## 2021-10-11 DIAGNOSIS — Z12.11 COLON CANCER SCREENING: ICD-10-CM

## 2021-10-11 DIAGNOSIS — Z13.220 SCREENING FOR HYPERLIPIDEMIA: ICD-10-CM

## 2021-10-11 PROCEDURE — G8482 FLU IMMUNIZE ORDER/ADMIN: HCPCS | Performed by: FAMILY MEDICINE

## 2021-10-11 PROCEDURE — 90732 PPSV23 VACC 2 YRS+ SUBQ/IM: CPT | Performed by: FAMILY MEDICINE

## 2021-10-11 PROCEDURE — 4040F PNEUMOC VAC/ADMIN/RCVD: CPT | Performed by: FAMILY MEDICINE

## 2021-10-11 PROCEDURE — G0009 ADMIN PNEUMOCOCCAL VACCINE: HCPCS | Performed by: FAMILY MEDICINE

## 2021-10-11 PROCEDURE — 1123F ACP DISCUSS/DSCN MKR DOCD: CPT | Performed by: FAMILY MEDICINE

## 2021-10-11 PROCEDURE — 3017F COLORECTAL CA SCREEN DOC REV: CPT | Performed by: FAMILY MEDICINE

## 2021-10-11 PROCEDURE — G0439 PPPS, SUBSEQ VISIT: HCPCS | Performed by: FAMILY MEDICINE

## 2021-10-11 ASSESSMENT — PATIENT HEALTH QUESTIONNAIRE - PHQ9
1. LITTLE INTEREST OR PLEASURE IN DOING THINGS: 0
SUM OF ALL RESPONSES TO PHQ QUESTIONS 1-9: 0
SUM OF ALL RESPONSES TO PHQ QUESTIONS 1-9: 0
SUM OF ALL RESPONSES TO PHQ9 QUESTIONS 1 & 2: 0
SUM OF ALL RESPONSES TO PHQ QUESTIONS 1-9: 0
2. FEELING DOWN, DEPRESSED OR HOPELESS: 0

## 2021-10-11 ASSESSMENT — LIFESTYLE VARIABLES
HOW OFTEN DO YOU HAVE A DRINK CONTAINING ALCOHOL: NEVER
AUDIT-C TOTAL SCORE: INCOMPLETE
AUDIT TOTAL SCORE: INCOMPLETE
HOW OFTEN DO YOU HAVE A DRINK CONTAINING ALCOHOL: 0

## 2021-10-11 NOTE — PROGRESS NOTES
Medicare Annual Wellness Visit  Name: Monica Spann Date: 10/11/2021   MRN: [id number] Sex: Female   Age: 78 y.o. Ethnicity: Non- / Non    : 1955 Race: White (non-)      Romeo Otoole is here for Medicare AWV    Screenings for behavioral, psychosocial and functional/safety risks, and cognitive dysfunction are all negative except as indicated below. These results, as well as other patient data from the [address] form, are documented in Flowsheets linked to this Encounter. Allergies   Allergen Reactions    Penicillins Rash         Prior to Visit Medications    Medication Sig Taking? Authorizing Provider   levothyroxine (SYNTHROID) 88 MCG tablet TAKE 1 TABLET BY MOUTH DAILY Yes Meli Pedro DO   vitamin D (ERGOCALCIFEROL) 75550 UNITS CAPS capsule Take 1 capsule by mouth once a week Yes Meli Pedro DO   POTASSIUM CITRATE-CITRIC ACID PO Take 1,080 mg by mouth. Yes Historical Provider, MD   acetaminophen (TYLENOL) 325 MG tablet Take 2 tablets by mouth every 6 hours for 28 days  SOFIYA Chou - CNP         Past Medical History:   Diagnosis Date    Kidney stone     Pneumonia     Thyroid disease        Past Surgical History:   Procedure Laterality Date    EYE MUSCLE SURGERY      age 14     LITHOTRIPSY      ,      TOTAL KNEE ARTHROPLASTY Right 2020    RIGHT TOTAL KNEE ARTHROPLASTY - DEPUY ADVANCED performed by Zachary Jiang MD at [place] [address]       No family history on file.     CareTeam (Including outside providers/suppliers regularly involved in providing care):   Patient Care Team:  Ag Stapleton DO as PCP - General  Ag Stapleton DO as PCP - [facility] Empaneled Provider    Wt Readings from Last 3 Encounters:   10/11/21 183 lb 12.8 oz (83.4 kg)   21 186 lb 9.6 oz (84.6 kg)   20 198 lb (89.8 kg)     Vitals:    10/11/21 1143   BP: 128/72   Pulse: 76   Resp: 12 Temp: 97 °F (36.1 °C)   TempSrc: Temporal   SpO2: 98%   Weight: 183 lb 12.8 oz (83.4 kg)   Height: 4' 11.5\" (1.511 m)     Body mass index is 36.5 kg/m². Based upon direct observation of the patient, evaluation of cognition reveals recent and remote memory intact. General Appearance: alert and oriented to person, place and time, well developed and well- nourished, in no acute distress  Skin: warm and dry, no rash or erythema  Head: normocephalic and atraumatic  Eyes: pupils equal, round, and reactive to light, extraocular eye movements intact, conjunctivae normal  ENT: tympanic membrane, external ear and ear canal normal bilaterally, nose without deformity, nasal mucosa and turbinates normal without polyps  Neck: supple and non-tender without mass, no thyromegaly or thyroid nodules, no cervical lymphadenopathy  Pulmonary/Chest: clear to auscultation bilaterally- no wheezes, rales or rhonchi, normal air movement, no respiratory distress  Cardiovascular: normal rate, regular rhythm, normal S1 and S2, no murmurs, rubs, clicks, or gallops, distal pulses intact, no carotid bruits  Abdomen: soft, non-tender, non-distended, normal bowel sounds, no masses or organomegaly  Extremities: no cyanosis, clubbing or edema  Musculoskeletal: normal range of motion, no joint swelling, deformity or tenderness  Neurologic: reflexes normal and symmetric, no cranial nerve deficit, gait, coordination and speech normal    Patient's complete Health Risk Assessment and screening values have been reviewed and are found in Flowsheets. The following problems were reviewed today and where indicated follow up appointments were made and/or referrals ordered. Positive Risk Factor Screenings with Interventions:            General Health and ACP:  General  In general, how would you say your health is?: Good  In the past 7 days, have you experienced any of the following?  New or Increased Pain, New or Increased Fatigue, Loneliness, Social Isolation, Stress or Anger?: None of These  Do you get the social and emotional support that you need?: Yes  Do you have a Living Will?: (!) No  Advance Directives     Power of  Living Will ACP-Advance Directive ACP-Power of     Not on File Filed on 11/02/20 Filed Not on File      General Health Risk Interventions:  · No Living Will: Patient declines ACP discussion/assistance    Health Habits/Nutrition:  Health Habits/Nutrition  Do you exercise for at least 20 minutes 2-3 times per week?: Yes  Have you lost any weight without trying in the past 3 months?: No  Do you eat only one meal per day?: No  Have you seen the dentist within the past year?: Yes  Body mass index: (!) 36.5  Health Habits/Nutrition Interventions:  · Inadequate physical activity:  patient is not ready to increase his/her physical activity level at this time    Hearing/Vision:  No exam data present  Hearing/Vision  Do you or your family notice any trouble with your hearing that hasn't been managed with hearing aids?: No  Do you have difficulty driving, watching TV, or doing any of your daily activities because of your eyesight?: No  Have you had an eye exam within the past year?: (!) No  Hearing/Vision Interventions:  · Vision concerns:  Encouraged to see ophthalmologist or optometrist    Safety:  Safety  Do you have working smoke detectors?: Yes  Have all throw rugs been removed or fastened?: (!) No  Do you have non-slip mats or surfaces in all bathtubs/showers?: Yes  Do all of your stairways have a railing or banister?: Yes  Are your doorways, halls and stairs free of clutter?: Yes  Do you always fasten your seatbelt when you are in a car?: Yes  Safety Interventions:  · Home safety tips provided     Personalized Preventive Plan   Current Health Maintenance Status  Immunization History   Administered Date(s) Administered    COVID-19, Moderna, PF, 100mcg/0.5mL 03/12/2021, 04/09/2021    Influenza A ([id number]) Vaccine PF IM 12/31/2009  Influenza Vaccine, unspecified formulation 09/07/2012, 09/15/2014, 09/25/2016    Influenza Virus Vaccine 09/07/2012, 09/15/2014, 08/31/2015    Influenza Whole 10/06/2009    Influenza, High Dose (Fluzone 65 yrs and older) 11/06/2019, 09/10/2020, 09/27/2021    Influenza, High-dose, Rejeana Fort, 65 yrs +, IM (Fluzone) 09/10/2020    Influenza, Quadv, IM, PF (6 mo and older Fluzone, Flulaval, Fluarix, and 3 yrs and older Afluria) 09/05/2017, 10/03/2018    Influenza, Triv, 3 Years and older, IM, PF (Afluria 5yrs and older) 09/25/2016    Pneumococcal Polysaccharide ([id number]) 01/27/2020, 10/11/2021    Td (Adult), 5 Lf Tetanus Toxoid, Pf (Tenivac, Decavac) 06/21/2005    Tdap (Boostrix, Adacel) 11/16/2010, 06/06/2015        Health Maintenance   Topic Date Due    Hepatitis C screen  Never done    Colon cancer screen colonoscopy  Never done    Shingles Vaccine (1 of 2) Never done    DEXA (modify frequency per FRAX score)  Never done    Breast cancer screen  02/02/2017    Annual Wellness Visit (AWV)  01/27/2021    TSH testing  09/30/2021    DTaP/Tdap/Td vaccine (3 - Td or Tdap) 06/06/2025    Lipid screen  09/30/2025    Flu vaccine  Completed    Pneumococcal 65+ years Vaccine  Completed    COVID-19 Vaccine  Completed    Hepatitis A vaccine  Aged Out    Hepatitis B vaccine  Aged Out    Hib vaccine  Aged Out    Meningococcal (ACWY) vaccine  Aged Out     Recommendations for "Hey, Neighbor!" Due: see orders and patient instructions/AVS.  . Recommended screening schedule for the next 5-10 years is provided to the patient in written form: see Patient Instructions/AVS.    Tiffanie Javier was seen today for medicare awv. Diagnoses and all orders for this visit:    Routine general medical examination at a health care facility    Alkaline phosphatase raised  -     COMPREHENSIVE METABOLIC PANEL; Future    Hypothyroidism, unspecified type  -     TSH without Reflex;  Future    10 year risk of MI or stroke < 7.5%  - LIPID PANEL    Screening for hyperlipidemia  -     LIPID PANEL    Colon cancer screening  -     Cologuard (For External Results Only); Future    Encounter for screening mammogram for malignant neoplasm of breast  -     KESHIA DIGITAL SCREEN W OR WO CAD BILATERAL; Future    Need for pneumococcal vaccination  -     PNEUMOVAX 23 subcutaneous/IM (Pneumococcal polysaccharide vaccine 23-valent >= 1yo)              Diagnosis Orders   1. Routine general medical examination at a health care facility     2. Alkaline phosphatase raised  COMPREHENSIVE METABOLIC PANEL   3. Hypothyroidism, unspecified type  TSH without Reflex   4. 10 year risk of MI or stroke < 7.5%  LIPID PANEL   5. Screening for hyperlipidemia  LIPID PANEL   6. Colon cancer screening  Cologuard (For External Results Only)   7. Encounter for screening mammogram for malignant neoplasm of breast  KESHIA DIGITAL SCREEN W OR WO CAD BILATERAL   8. Need for pneumococcal vaccination  PNEUMOVAX 23 subcutaneous/IM (Pneumococcal polysaccharide vaccine 23-valent >= 1yo)     #2 - #5: Due for lab work for the end of the year. #6:  He is agreeable to rescreen with Cologuard again. #7:  Encouraged to complete mammogram before the end of the year.

## 2021-10-11 NOTE — PATIENT INSTRUCTIONS
CHECK WITH LINDSEY THAT IT IS STILL COVERED ON YOUR INSURANCE PLAN. THE PNUEMONIA SHOT WAS DONE TODAY. --You can get your lab work or x-ray done at [facility]. LAB: [address]  Lab hours (7AM - 5PM Monday through Friday; 8AM - noon on Saturdays),   Ph# [phone]    X-RAY: [address]  Radiology hours, (7:00AM - 5PM Monday through Friday only)  Ph# [phone] or [address] [place] [address]  --Call our office in 1 week if you have not heard about the results. Or check [facility] if you have previously enrolled. Personalized Preventive Plan for Rianna Prom - 10/11/2021  Medicare offers a range of preventive health benefits. Some of the tests and screenings are paid in full while other may be subject to a deductible, co-insurance, and/or copay. Some of these benefits include a comprehensive review of your medical history including lifestyle, illnesses that may run in your family, and various assessments and screenings as appropriate. After reviewing your medical record and screening and assessments performed today your provider may have ordered immunizations, labs, imaging, and/or referrals for you. A list of these orders (if applicable) as well as your Preventive Care list are included within your After Visit Summary for your review. Other Preventive Recommendations:    · A preventive eye exam performed by an eye specialist is recommended every 1-2 years to screen for glaucoma; cataracts, macular degeneration, and other eye disorders. · A preventive dental visit is recommended every 6 months. · Try to get at least 150 minutes of exercise per week or 10,000 steps per day on a pedometer . · Order or download the FREE \"Exercise & Physical Activity: Your Everyday Guide\" from [employer]. Call 9-633.460.2923 or search [employer] online. · You need 3123-3716 mg of calcium and 7390-4055 IU of vitamin D per day.  It is possible to meet your calcium requirement with diet alone, but a vitamin D supplement is usually necessary to meet this goal.  · When exposed to the sun, use a sunscreen that protects against both UVA and UVB radiation with an SPF of 30 or greater. Reapply every 2 to 3 hours or after sweating, drying off with a towel, or swimming. · Always wear a seat belt when traveling in a car. Always wear a helmet when riding a bicycle or motorcycle.

## 2021-10-27 DIAGNOSIS — R74.8 ALKALINE PHOSPHATASE RAISED: ICD-10-CM

## 2021-10-27 DIAGNOSIS — E03.9 HYPOTHYROIDISM, UNSPECIFIED TYPE: ICD-10-CM

## 2021-10-27 LAB
A/G RATIO: 1.6 (ref 1.1–2.2)
ALBUMIN SERPL-MCNC: 4.4 G/DL (ref 3.4–5)
ALP BLD-CCNC: 114 U/L (ref 40–129)
ALT SERPL-CCNC: 29 U/L (ref 10–40)
ANION GAP SERPL CALCULATED.3IONS-SCNC: 15 MMOL/L (ref 3–16)
AST SERPL-CCNC: 32 U/L (ref 15–37)
BILIRUB SERPL-MCNC: 0.9 MG/DL (ref 0–1)
BUN BLDV-MCNC: 13 MG/DL (ref 7–20)
CALCIUM SERPL-MCNC: 9.7 MG/DL (ref 8.3–10.6)
CHLORIDE BLD-SCNC: 103 MMOL/L (ref 99–110)
CHOLESTEROL, TOTAL: 220 MG/DL (ref 0–199)
CO2: 22 MMOL/L (ref 21–32)
CREAT SERPL-MCNC: 0.6 MG/DL (ref 0.6–1.2)
GFR AFRICAN AMERICAN: >60
GFR NON-AFRICAN AMERICAN: >60
GLOBULIN: 2.7 G/DL
GLUCOSE BLD-MCNC: 88 MG/DL (ref 70–99)
HDLC SERPL-MCNC: 57 MG/DL (ref 40–60)
LDL CHOLESTEROL CALCULATED: 141 MG/DL
POTASSIUM SERPL-SCNC: 4.3 MMOL/L (ref 3.5–5.1)
SODIUM BLD-SCNC: 140 MMOL/L (ref 136–145)
TOTAL PROTEIN: 7.1 G/DL (ref 6.4–8.2)
TRIGL SERPL-MCNC: 111 MG/DL (ref 0–150)
TSH SERPL DL<=0.05 MIU/L-ACNC: 1.96 UIU/ML (ref 0.27–4.2)
VLDLC SERPL CALC-MCNC: 22 MG/DL

## 2021-10-28 DIAGNOSIS — E03.9 HYPOTHYROIDISM, UNSPECIFIED TYPE: ICD-10-CM

## 2021-10-28 RX ORDER — LEVOTHYROXINE SODIUM 88 UG/1
88 TABLET ORAL DAILY
Qty: 90 TABLET | Refills: 3 | Status: SHIPPED | OUTPATIENT
Start: 2021-10-28

## 2021-11-03 DIAGNOSIS — Z12.11 COLON CANCER SCREENING: ICD-10-CM

## 2023-01-11 LAB
ALBUMIN SERPL-MCNC: 4.2 G/DL (ref 3.4–5)
ANION GAP SERPL CALCULATED.3IONS-SCNC: 12 MMOL/L (ref 3–16)
BACTERIA: ABNORMAL /HPF
BILIRUBIN URINE: NEGATIVE
BLOOD, URINE: ABNORMAL
BUN BLDV-MCNC: 11 MG/DL (ref 7–20)
CALCIUM OXALATE CRYSTALS: PRESENT
CALCIUM SERPL-MCNC: 9.7 MG/DL (ref 8.3–10.6)
CHLORIDE BLD-SCNC: 101 MMOL/L (ref 99–110)
CLARITY: ABNORMAL
CO2: 26 MMOL/L (ref 21–32)
COLOR: YELLOW
CREAT SERPL-MCNC: 0.5 MG/DL (ref 0.6–1.2)
EPITHELIAL CELLS, UA: 3 /HPF (ref 0–5)
GFR SERPL CREATININE-BSD FRML MDRD: >60 ML/MIN/{1.73_M2}
GLUCOSE BLD-MCNC: 93 MG/DL (ref 70–99)
GLUCOSE URINE: NEGATIVE MG/DL
HYALINE CASTS: 4 /LPF (ref 0–8)
KETONES, URINE: NEGATIVE MG/DL
LEUKOCYTE ESTERASE, URINE: ABNORMAL
MICROSCOPIC EXAMINATION: YES
NITRITE, URINE: NEGATIVE
PH UA: 5.5 (ref 5–8)
PHOSPHORUS: 3.1 MG/DL (ref 2.5–4.9)
POTASSIUM SERPL-SCNC: 3.8 MMOL/L (ref 3.5–5.1)
PROTEIN UA: 30 MG/DL
RBC UA: 18 /HPF (ref 0–4)
SODIUM BLD-SCNC: 139 MMOL/L (ref 136–145)
SPECIFIC GRAVITY UA: 1.02 (ref 1–1.03)
URINE TYPE: ABNORMAL
UROBILINOGEN, URINE: 0.2 E.U./DL
WBC UA: 109 /HPF (ref 0–5)

## (undated) DEVICE — CONTAINER,SPECIMEN,OR STERILE,4OZ: Brand: MEDLINE

## (undated) DEVICE — SUTURE VCRL SZ 0 L36IN ABSRB UD L36MM CT-1 1/2 CIR J946H

## (undated) DEVICE — DECANTER FLD 9IN ST BG FOR ASEP TRNSF OF FLD

## (undated) DEVICE — RECIPROCATING BLADE, DOUBLE SIDED, OFFSET  (70.0 X 0.64 X 12.6MM)

## (undated) DEVICE — SUTURE MCRYL SZ 4-0 L27IN ABSRB UD L19MM PS-2 1/2 CIR PRIM Y426H

## (undated) DEVICE — 35 ML SYRINGE LUER-LOCK TIP: Brand: MONOJECT

## (undated) DEVICE — TOTAL BASIC PK

## (undated) DEVICE — SUTURE VCRL SZ 2-0 L36IN ABSRB UD L36MM CT-1 1/2 CIR J945H

## (undated) DEVICE — GLOVE ORANGE PI 8   MSG9080

## (undated) DEVICE — SUTURE STRATAFIX SZ 1 L14IN ABSRB VLT L36MM MO-4 TAPERPOINT SXPD2B400

## (undated) DEVICE — PENCIL ES ULT VAC W TELSCP NOSE EZ CLN BLDE 10FT

## (undated) DEVICE — DRESSING CNTCT 4X12IN IS THERABOND 3D

## (undated) DEVICE — MASC TURNOVER KIT: Brand: MEDLINE INDUSTRIES, INC.

## (undated) DEVICE — Z INACTIVE USE 2660664 SOLUTION IRRIG 3000ML 0.9% SOD CHL USP UROMATIC PLAS CONT

## (undated) DEVICE — 3 BONE CEMENT MIXER: Brand: MIXEVAC

## (undated) DEVICE — DUAL CUT SAGITTAL BLADE

## (undated) DEVICE — 450 ML BOTTLE OF 0.05% CHLORHEXIDINE GLUCONATE IN 99.95% STERILE WATER FOR IRRIGATION, USP AND APPLICATOR.: Brand: IRRISEPT ANTIMICROBIAL WOUND LAVAGE

## (undated) DEVICE — KNEE HOLDER DISPOSABLE LINER: Brand: ALVARADO®  KNEE SUPPORT

## (undated) DEVICE — T4 HOOD

## (undated) DEVICE — FAIRFIELD KNEE LF

## (undated) DEVICE — ATTUNE SOLO PINNING SYSTEM

## (undated) DEVICE — STERILE TOTAL KNEE DRAPE PACK: Brand: CARDINAL HEALTH

## (undated) DEVICE — SYSTEM SKIN CLSR 22CM DERMBND PRINEO

## (undated) DEVICE — Z DISCONTINUED USE 2275686 GLOVE SURG SZ 8 L12IN FNGR THK13MIL WHT ISOLEX POLYISOPRENE

## (undated) DEVICE — HOOD, PEEL-AWAY: Brand: FLYTE

## (undated) DEVICE — HANDPIECE SET WITH HIGH FLOW TIP AND SUCTION TUBE: Brand: INTERPULSE